# Patient Record
Sex: MALE | Race: WHITE | NOT HISPANIC OR LATINO
[De-identification: names, ages, dates, MRNs, and addresses within clinical notes are randomized per-mention and may not be internally consistent; named-entity substitution may affect disease eponyms.]

---

## 2017-04-24 ENCOUNTER — APPOINTMENT (OUTPATIENT)
Dept: ORTHOPEDIC SURGERY | Facility: CLINIC | Age: 37
End: 2017-04-24

## 2017-04-24 DIAGNOSIS — M67.912 UNSPECIFIED DISORDER OF SYNOVIUM AND TENDON, LEFT SHOULDER: ICD-10-CM

## 2017-05-22 ENCOUNTER — OUTPATIENT (OUTPATIENT)
Dept: OUTPATIENT SERVICES | Facility: HOSPITAL | Age: 37
LOS: 1 days | End: 2017-05-22
Payer: MEDICAID

## 2017-05-22 ENCOUNTER — APPOINTMENT (OUTPATIENT)
Dept: MRI IMAGING | Facility: HOSPITAL | Age: 37
End: 2017-05-22

## 2017-05-22 PROCEDURE — 73221 MRI JOINT UPR EXTREM W/O DYE: CPT

## 2017-05-26 ENCOUNTER — APPOINTMENT (OUTPATIENT)
Dept: ORTHOPEDIC SURGERY | Facility: CLINIC | Age: 37
End: 2017-05-26

## 2017-11-15 ENCOUNTER — OUTPATIENT (OUTPATIENT)
Dept: OUTPATIENT SERVICES | Facility: HOSPITAL | Age: 37
LOS: 1 days | End: 2017-11-15
Payer: MEDICAID

## 2017-11-15 ENCOUNTER — APPOINTMENT (OUTPATIENT)
Dept: MRI IMAGING | Facility: HOSPITAL | Age: 37
End: 2017-11-15

## 2017-11-15 PROCEDURE — 73221 MRI JOINT UPR EXTREM W/O DYE: CPT

## 2017-11-15 PROCEDURE — 73221 MRI JOINT UPR EXTREM W/O DYE: CPT | Mod: 26,RT

## 2017-11-27 ENCOUNTER — APPOINTMENT (OUTPATIENT)
Dept: CARDIOLOGY | Facility: CLINIC | Age: 37
End: 2017-11-27

## 2017-11-27 ENCOUNTER — OUTPATIENT (OUTPATIENT)
Dept: OUTPATIENT SERVICES | Facility: HOSPITAL | Age: 37
LOS: 1 days | End: 2017-11-27

## 2017-11-27 ENCOUNTER — APPOINTMENT (OUTPATIENT)
Dept: CARDIOLOGY | Facility: CLINIC | Age: 37
End: 2017-11-27
Payer: MEDICAID

## 2017-11-27 ENCOUNTER — NON-APPOINTMENT (OUTPATIENT)
Age: 37
End: 2017-11-27

## 2017-11-27 VITALS
SYSTOLIC BLOOD PRESSURE: 118 MMHG | DIASTOLIC BLOOD PRESSURE: 82 MMHG | RESPIRATION RATE: 17 BRPM | OXYGEN SATURATION: 99 % | BODY MASS INDEX: 27.74 KG/M2 | WEIGHT: 183 LBS | HEIGHT: 68 IN | HEART RATE: 100 BPM

## 2017-11-27 VITALS
WEIGHT: 179.9 LBS | HEART RATE: 80 BPM | HEIGHT: 68.5 IN | RESPIRATION RATE: 16 BRPM | TEMPERATURE: 98 F | SYSTOLIC BLOOD PRESSURE: 108 MMHG | DIASTOLIC BLOOD PRESSURE: 64 MMHG

## 2017-11-27 DIAGNOSIS — M67.912 UNSPECIFIED DISORDER OF SYNOVIUM AND TENDON, LEFT SHOULDER: ICD-10-CM

## 2017-11-27 DIAGNOSIS — R94.31 ABNORMAL ELECTROCARDIOGRAM [ECG] [EKG]: ICD-10-CM

## 2017-11-27 DIAGNOSIS — E03.9 HYPOTHYROIDISM, UNSPECIFIED: ICD-10-CM

## 2017-11-27 DIAGNOSIS — Z98.890 OTHER SPECIFIED POSTPROCEDURAL STATES: Chronic | ICD-10-CM

## 2017-11-27 DIAGNOSIS — Z82.49 FAMILY HISTORY OF ISCHEMIC HEART DISEASE AND OTHER DISEASES OF THE CIRCULATORY SYSTEM: ICD-10-CM

## 2017-11-27 PROCEDURE — 93000 ELECTROCARDIOGRAM COMPLETE: CPT

## 2017-11-27 PROCEDURE — 99244 OFF/OP CNSLTJ NEW/EST MOD 40: CPT

## 2017-11-27 NOTE — H&P PST ADULT - HISTORY OF PRESENT ILLNESS
38y/o male presents for preop eval for scheduled left shoulder distal clavicle excision on 12/5/2017. Pt with c/o left shoulder pain for past two years that has progressively worsened.   Per pt recent imaging show tendon disorder.

## 2017-11-27 NOTE — H&P PST ADULT - PRO ARRIVE FROM
Pharmacist Admission Medication Reconciliation Pending Note    Prior to Admission Medications were reviewed by the pharmacist and pended for provider review during admission medication reconciliation.    Medications were pended by the pharmacist at this time as follows:    Pended Admission Order Reconciliation Actions     Order Name Action Reordered As    aspirin 81 MG tablet Order for Admission aspirin chewable 81 mg    cholecalciferol (VITAMIN D3) 1000 UNITS tablet Order for Admission cholecalciferol (VITAMIN D3) tablet 1,000 Units    traMADOL (ULTRAM) 50 MG tablet Do Not Order for Admission     budesonide (ENTOCORT EC) 3 MG 24 hr capsule Order for Admission budesonide (ENTOCORT EC) 24 hr capsule 6 mg    loperamide (IMODIUM) 2 MG capsule Do Not Order for Admission     pravastatin (PRAVACHOL) 10 MG tablet Order for Admission pravastatin (PRAVACHOL) tablet 10 mg    ranitidine (ZANTAC) 150 MG tablet Order for Admission famotidine (PEPCID) tablet 20 mg    bisacodyl (DULCOLAX) 10 MG suppository Do Not Order for Admission     oxybutynin (DITROPAN) 5 MG tablet Order for Admission oxybutynin (DITROPAN) tablet 5 mg    magnesium hydroxide (MILK OF MAGNESIA) 400 MG/5ML suspension Do Not Order for Admission     docusate sodium-sennosides (SENOKOT S) 50-8.6 MG per tablet Order for Admission docusate sodium-sennosides (SENOKOT S) 50-8.6 MG 1 tablet    sodium biphosphate (FLEET) 7-19 GM/118ML enema Do Not Order for Admission     Lactobacillus (FLORANEX) Tab Do Not Order for Admission     morphine (ROXANOL) 100 MG/5ML concentrated solution Do Not Order for Admission     latanoprost (XALATAN) 0.005 % ophthalmic solution Order for Admission latanoprost (XALATAN) 0.005 % ophthalmic solution 1 drop    ALPRAZolam (XANAX) 0.25 MG tablet Order for Admission ALPRAZolam (XANAX) tablet 0.25-0.5 mg    chlorhexidine gluconate (PERIDEX) 0.12 % solution Do Not Order for Admission     prochlorperazine (COMPAZINE) 10 MG tablet Do Not Order for  Admission     acetaminophen (TYLENOL) 325 MG tablet Do Not Order for Admission     acetaminophen (TYLENOL) 650 MG suppository Do Not Order for Admission     HYDROcodone-acetaminophen (NORCO) 5-325 MG per tablet Do Not Order for Admission     atropine (ISOPTO ATROPINE) 1 % ophthalmic solution Do Not Order for Admission     levofloxacin (LEVAQUIN) 500 MG tablet Do Not Order for Admission             Orders Pended To Continue For Hospital Stay     ID Description Pended By When Reason    [id number] ALPRAZolam (XANAX) tablet 0.25-0.5 mg-NIGHTLY [facility]0     [id number] aspirin chewable 81 mg-DAILY [facility] 03/02/17 1140     [id number] budesonide (ENTOCORT EC) 24 hr capsule 6 mg-EVERY OTHER DAY [facility] 1140     [id number] cholecalciferol (VITAMIN D3) tablet 1,000 Units-DAILY [facility]17 1140     [id number] docusate sodium-sennosides (SENOKOT S) 50-8.6 MG 1 tablet-DAILY [facility] 1140     [id number] latanoprost (XALATAN) 0.005 % ophthalmic solution 1 drop-NIGHTLY [facility]17 1140     [id number] oxybutynin (DITROPAN) tablet 5 mg-2 TIMES DAILY [facility]17 1140     [id number] pravastatin (PRAVACHOL) tablet 10 mg-NIGHTLY [facility]17 1140     [id number] famotidine (PEPCID) tablet 20 mg-NIGHTLY [facility] 1140             Pharmacist Notations:           Orders that are ultimately reconciled and signed during admission medication reconciliation may differ from the pended actions above.    Please contact the pharmacist for questions.    [facility]RQUEZ [facility]  3/2/2017 11:40 AM   home

## 2017-11-27 NOTE — H&P PST ADULT - GASTROINTESTINAL DETAILS
bowel sounds normal/soft/nontender/no distention/no masses palpable nontender/no rebound tenderness/soft/no distention/no masses palpable/bowel sounds normal

## 2017-11-27 NOTE — H&P PST ADULT - PROBLEM SELECTOR PLAN 2
pt instructed to take [place] thyroid & levothyroxine on morning of surgery  medical eval requested by surgeon, pending copy of report  Spoke with Alissa in Dr Kumar (pcp) office.   Cbc, bmp done   pending copy of results.

## 2017-11-27 NOTE — H&P PST ADULT - PROBLEM SELECTOR PLAN 1
scheduled left shoulder distal clavicle excision on 12/5/2017  preop instructions, gi prophylaxis & surgical soap given  pt verbalized understanding

## 2017-11-27 NOTE — H&P PST ADULT - FAMILY HISTORY
Father  Still living? No  Family history of heart disease, Age at diagnosis: Age Unknown  Family history of thyroid cancer, Age at diagnosis: Age Unknown

## 2017-11-27 NOTE — H&P PST ADULT - PMH
Anxiety    Hypothyroidism Anxiety    Hypothyroidism    Unspecified disorder of synovium and tendon, left shoulder

## 2017-11-29 ENCOUNTER — APPOINTMENT (OUTPATIENT)
Dept: CARDIOLOGY | Facility: CLINIC | Age: 37
End: 2017-11-29
Payer: MEDICAID

## 2017-11-29 ENCOUNTER — APPOINTMENT (OUTPATIENT)
Dept: ORTHOPEDIC SURGERY | Facility: CLINIC | Age: 37
End: 2017-11-29
Payer: MEDICAID

## 2017-11-29 DIAGNOSIS — M25.511 PAIN IN RIGHT SHOULDER: ICD-10-CM

## 2017-11-29 PROCEDURE — 73030 X-RAY EXAM OF SHOULDER: CPT | Mod: RT

## 2017-11-29 PROCEDURE — 93015 CV STRESS TEST SUPVJ I&R: CPT

## 2017-11-29 PROCEDURE — 99213 OFFICE O/P EST LOW 20 MIN: CPT

## 2017-12-05 ENCOUNTER — APPOINTMENT (OUTPATIENT)
Dept: ORTHOPEDIC SURGERY | Facility: AMBULATORY SURGERY CENTER | Age: 37
End: 2017-12-05

## 2017-12-05 ENCOUNTER — OUTPATIENT (OUTPATIENT)
Dept: OUTPATIENT SERVICES | Facility: HOSPITAL | Age: 37
LOS: 1 days | Discharge: HOME | End: 2017-12-05
Payer: MEDICAID

## 2017-12-05 ENCOUNTER — TRANSCRIPTION ENCOUNTER (OUTPATIENT)
Age: 37
End: 2017-12-05

## 2017-12-05 VITALS
DIASTOLIC BLOOD PRESSURE: 57 MMHG | TEMPERATURE: 97 F | RESPIRATION RATE: 16 BRPM | HEART RATE: 66 BPM | WEIGHT: 179.9 LBS | HEIGHT: 68.5 IN | SYSTOLIC BLOOD PRESSURE: 105 MMHG | OXYGEN SATURATION: 99 %

## 2017-12-05 VITALS
TEMPERATURE: 98 F | SYSTOLIC BLOOD PRESSURE: 104 MMHG | OXYGEN SATURATION: 98 % | HEART RATE: 73 BPM | DIASTOLIC BLOOD PRESSURE: 72 MMHG

## 2017-12-05 DIAGNOSIS — M67.912 UNSPECIFIED DISORDER OF SYNOVIUM AND TENDON, LEFT SHOULDER: ICD-10-CM

## 2017-12-05 DIAGNOSIS — Z98.890 OTHER SPECIFIED POSTPROCEDURAL STATES: Chronic | ICD-10-CM

## 2017-12-05 PROCEDURE — 29824 SHO ARTHRS SRG DSTL CLAVICLC: CPT | Mod: LT

## 2017-12-05 PROCEDURE — 29826 SHO ARTHRS SRG DECOMPRESSION: CPT | Mod: LT

## 2017-12-05 NOTE — ASU DISCHARGE PLAN (ADULT/PEDIATRIC). - NOTIFY
Bleeding that does not stop/Inability to Tolerate Liquids or Foods/Fever greater than 101/Numbness, color, or temperature change to extremity/Pain not relieved by Medications/Swelling that continues/Persistent Nausea and Vomiting

## 2017-12-15 ENCOUNTER — APPOINTMENT (OUTPATIENT)
Dept: ORTHOPEDIC SURGERY | Facility: CLINIC | Age: 37
End: 2017-12-15
Payer: MEDICAID

## 2017-12-15 DIAGNOSIS — M19.019 PRIMARY OSTEOARTHRITIS, UNSPECIFIED SHOULDER: ICD-10-CM

## 2017-12-15 DIAGNOSIS — M75.42 IMPINGEMENT SYNDROME OF LEFT SHOULDER: ICD-10-CM

## 2017-12-15 PROCEDURE — 99024 POSTOP FOLLOW-UP VISIT: CPT

## 2017-12-15 RX ORDER — ERGOCALCIFEROL 1.25 MG/1
1.25 MG CAPSULE, LIQUID FILLED ORAL
Qty: 4 | Refills: 0 | Status: ACTIVE | COMMUNITY
Start: 2017-11-10

## 2018-01-30 ENCOUNTER — RX RENEWAL (OUTPATIENT)
Age: 38
End: 2018-01-30

## 2018-02-15 ENCOUNTER — APPOINTMENT (OUTPATIENT)
Dept: ORTHOPEDIC SURGERY | Facility: CLINIC | Age: 38
End: 2018-02-15
Payer: MEDICAID

## 2018-02-15 PROCEDURE — 99024 POSTOP FOLLOW-UP VISIT: CPT

## 2018-04-27 ENCOUNTER — OUTPATIENT (OUTPATIENT)
Dept: OUTPATIENT SERVICES | Facility: HOSPITAL | Age: 38
LOS: 1 days | End: 2018-04-27
Payer: MEDICAID

## 2018-04-27 ENCOUNTER — APPOINTMENT (OUTPATIENT)
Dept: MRI IMAGING | Facility: HOSPITAL | Age: 38
End: 2018-04-27
Payer: MEDICAID

## 2018-04-27 DIAGNOSIS — Z00.8 ENCOUNTER FOR OTHER GENERAL EXAMINATION: ICD-10-CM

## 2018-04-27 DIAGNOSIS — Z98.890 OTHER SPECIFIED POSTPROCEDURAL STATES: Chronic | ICD-10-CM

## 2018-04-27 PROCEDURE — 72141 MRI NECK SPINE W/O DYE: CPT | Mod: 26

## 2018-04-27 PROCEDURE — 72141 MRI NECK SPINE W/O DYE: CPT

## 2018-05-30 ENCOUNTER — APPOINTMENT (OUTPATIENT)
Dept: ORTHOPEDIC SURGERY | Facility: CLINIC | Age: 38
End: 2018-05-30
Payer: MEDICAID

## 2018-05-30 DIAGNOSIS — M47.22 OTHER SPONDYLOSIS WITH RADICULOPATHY, CERVICAL REGION: ICD-10-CM

## 2018-05-30 PROCEDURE — 99215 OFFICE O/P EST HI 40 MIN: CPT

## 2018-06-26 ENCOUNTER — RX RENEWAL (OUTPATIENT)
Age: 38
End: 2018-06-26

## 2018-08-17 PROBLEM — M67.912 UNSPECIFIED DISORDER OF SYNOVIUM AND TENDON, LEFT SHOULDER: Chronic | Status: ACTIVE | Noted: 2017-11-27

## 2018-08-18 ENCOUNTER — APPOINTMENT (OUTPATIENT)
Dept: RADIOLOGY | Facility: HOSPITAL | Age: 38
End: 2018-08-18
Payer: MEDICAID

## 2018-08-18 ENCOUNTER — OUTPATIENT (OUTPATIENT)
Dept: OUTPATIENT SERVICES | Facility: HOSPITAL | Age: 38
LOS: 1 days | End: 2018-08-18
Payer: MEDICAID

## 2018-08-18 DIAGNOSIS — Z98.890 OTHER SPECIFIED POSTPROCEDURAL STATES: Chronic | ICD-10-CM

## 2018-08-18 DIAGNOSIS — Z00.8 ENCOUNTER FOR OTHER GENERAL EXAMINATION: ICD-10-CM

## 2018-08-18 PROCEDURE — 72040 X-RAY EXAM NECK SPINE 2-3 VW: CPT

## 2018-08-18 PROCEDURE — 72040 X-RAY EXAM NECK SPINE 2-3 VW: CPT | Mod: 26

## 2018-09-22 ENCOUNTER — APPOINTMENT (OUTPATIENT)
Dept: RADIOLOGY | Facility: HOSPITAL | Age: 38
End: 2018-09-22
Payer: MEDICAID

## 2018-09-22 ENCOUNTER — OUTPATIENT (OUTPATIENT)
Dept: OUTPATIENT SERVICES | Facility: HOSPITAL | Age: 38
LOS: 1 days | End: 2018-09-22
Payer: MEDICAID

## 2018-09-22 DIAGNOSIS — Z98.890 OTHER SPECIFIED POSTPROCEDURAL STATES: Chronic | ICD-10-CM

## 2018-09-22 DIAGNOSIS — Z00.8 ENCOUNTER FOR OTHER GENERAL EXAMINATION: ICD-10-CM

## 2018-09-22 PROCEDURE — 72100 X-RAY EXAM L-S SPINE 2/3 VWS: CPT | Mod: 26

## 2018-09-22 PROCEDURE — 73523 X-RAY EXAM HIPS BI 5/> VIEWS: CPT | Mod: 26

## 2018-09-22 PROCEDURE — 73523 X-RAY EXAM HIPS BI 5/> VIEWS: CPT

## 2018-09-22 PROCEDURE — 72100 X-RAY EXAM L-S SPINE 2/3 VWS: CPT

## 2018-10-11 ENCOUNTER — APPOINTMENT (OUTPATIENT)
Dept: MRI IMAGING | Facility: HOSPITAL | Age: 38
End: 2018-10-11
Payer: MEDICAID

## 2018-10-11 ENCOUNTER — OUTPATIENT (OUTPATIENT)
Dept: OUTPATIENT SERVICES | Facility: HOSPITAL | Age: 38
LOS: 1 days | End: 2018-10-11
Payer: MEDICAID

## 2018-10-11 DIAGNOSIS — Z98.890 OTHER SPECIFIED POSTPROCEDURAL STATES: Chronic | ICD-10-CM

## 2018-10-11 DIAGNOSIS — Z00.8 ENCOUNTER FOR OTHER GENERAL EXAMINATION: ICD-10-CM

## 2018-10-11 PROCEDURE — 72148 MRI LUMBAR SPINE W/O DYE: CPT | Mod: 26

## 2018-10-11 PROCEDURE — 72148 MRI LUMBAR SPINE W/O DYE: CPT

## 2019-10-23 ENCOUNTER — APPOINTMENT (OUTPATIENT)
Dept: ORTHOPEDIC SURGERY | Facility: CLINIC | Age: 39
End: 2019-10-23
Payer: MEDICAID

## 2019-10-23 PROCEDURE — 99213 OFFICE O/P EST LOW 20 MIN: CPT

## 2019-10-23 PROCEDURE — 73080 X-RAY EXAM OF ELBOW: CPT

## 2019-10-25 ENCOUNTER — OUTPATIENT (OUTPATIENT)
Dept: OUTPATIENT SERVICES | Facility: HOSPITAL | Age: 39
LOS: 1 days | End: 2019-10-25

## 2019-10-25 VITALS
WEIGHT: 151.9 LBS | SYSTOLIC BLOOD PRESSURE: 110 MMHG | TEMPERATURE: 98 F | HEART RATE: 78 BPM | HEIGHT: 67.5 IN | RESPIRATION RATE: 14 BRPM | DIASTOLIC BLOOD PRESSURE: 60 MMHG | OXYGEN SATURATION: 99 %

## 2019-10-25 DIAGNOSIS — S46.211A STRAIN OF MUSCLE, FASCIA AND TENDON OF OTHER PARTS OF BICEPS, RIGHT ARM, INITIAL ENCOUNTER: ICD-10-CM

## 2019-10-25 DIAGNOSIS — Z98.890 OTHER SPECIFIED POSTPROCEDURAL STATES: Chronic | ICD-10-CM

## 2019-10-25 LAB
HCT VFR BLD CALC: 40 % — SIGNIFICANT CHANGE UP (ref 39–50)
HGB BLD-MCNC: 12.7 G/DL — LOW (ref 13–17)
MCHC RBC-ENTMCNC: 24.2 PG — LOW (ref 27–34)
MCHC RBC-ENTMCNC: 31.8 % — LOW (ref 32–36)
MCV RBC AUTO: 76.3 FL — LOW (ref 80–100)
NRBC # FLD: 0 K/UL — SIGNIFICANT CHANGE UP (ref 0–0)
PLATELET # BLD AUTO: 306 K/UL — SIGNIFICANT CHANGE UP (ref 150–400)
PMV BLD: 10.5 FL — SIGNIFICANT CHANGE UP (ref 7–13)
RBC # BLD: 5.24 M/UL — SIGNIFICANT CHANGE UP (ref 4.2–5.8)
RBC # FLD: 14.4 % — SIGNIFICANT CHANGE UP (ref 10.3–14.5)
WBC # BLD: 6.05 K/UL — SIGNIFICANT CHANGE UP (ref 3.8–10.5)
WBC # FLD AUTO: 6.05 K/UL — SIGNIFICANT CHANGE UP (ref 3.8–10.5)

## 2019-10-25 NOTE — H&P PST ADULT - NEGATIVE CARDIOVASCULAR SYMPTOMS
no claudication/no chest pain/no dyspnea on exertion/no paroxysmal nocturnal dyspnea/no orthopnea/no palpitations/no peripheral edema

## 2019-10-25 NOTE — H&P PST ADULT - NSICDXPASTMEDICALHX_GEN_ALL_CORE_FT
PAST MEDICAL HISTORY:  Anxiety no longer on medications    Attention deficit disorder (ADD)     Hypothyroidism self- corrected, no longer on medications    Spinal stenosis herniated disc    Unspecified disorder of synovium and tendon, left shoulder

## 2019-10-25 NOTE — H&P PST ADULT - HISTORY OF PRESENT ILLNESS
38 y/o male presents to PST with preop Dx strain of muscle, fascia and tendon of other parts of biceps, right arm scheduled for right distal biceps tendon reinsertion on 10/31/19. 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 Denies swelling, denies loss of sensation, tingling, denies limited range of motion. Reports pain is produced by hyperextending arm posteriorly or pulling motion. Reports intermittent pain

## 2019-10-25 NOTE — H&P PST ADULT - NSICDXPASTSURGICALHX_GEN_ALL_CORE_FT
PAST SURGICAL HISTORY:  H/O arthroscopy of shoulder right 2005    Status post shoulder surgery left shoulder , clavicle resection 2017

## 2019-10-25 NOTE — H&P PST ADULT - RS GEN PE MLT RESP DETAILS PC
no wheezes/no rales/airway patent/clear to auscultation bilaterally/no rhonchi/respirations non-labored/breath sounds equal/good air movement

## 2019-10-25 NOTE — H&P PST ADULT - ASSESSMENT
Problem: strain of muscle, fascia and tendon of other parts of biceps, right arm      Assessment and Plan: Patient scheduled for right distal biceps tendon reinsertion on 10/31/19.  Patient provided with verbal and written presurgical instructions; verbalized understanding  with teach back.    Patient provided with famotidine for GI prophylaxis; verbalized understanding.    Patient provided with Chlorhexidine wash, verbal and written instructions reviewed. Patient demonstrated understanding with teach back.     OR booking notified of right shoulder anchor      Echo and Stress test requested    Medical evaluation requested by surgeon, patient verbalized understanding, will make appointment Problem: strain of muscle, fascia and tendon of other parts of biceps, right arm      Assessment and Plan: Patient scheduled for right distal biceps tendon reinsertion on 10/31/19.  Patient provided with verbal and written presurgical instructions; verbalized understanding  with teach back.    Patient provided with famotidine for GI prophylaxis; verbalized understanding.    Patient provided with Chlorhexidine wash, verbal and written instructions reviewed. Patient demonstrated understanding with teach back.     OR booking notified of right shoulder anchor      Echo and Stress test in chart     Medical evaluation requested by surgeon, patient verbalized understanding, will make appointment

## 2019-10-25 NOTE — H&P PST ADULT - NSICDXFAMILYHX_GEN_ALL_CORE_FT
FAMILY HISTORY:  Father  Still living? No  Family history of heart disease, Age at diagnosis: Age Unknown  Family history of thyroid cancer, Age at diagnosis: Age Unknown

## 2019-10-28 PROBLEM — F41.9 ANXIETY DISORDER, UNSPECIFIED: Chronic | Status: ACTIVE | Noted: 2017-11-27

## 2019-10-28 PROBLEM — F98.8 OTHER SPECIFIED BEHAVIORAL AND EMOTIONAL DISORDERS WITH ONSET USUALLY OCCURRING IN CHILDHOOD AND ADOLESCENCE: Chronic | Status: ACTIVE | Noted: 2019-10-25

## 2019-10-28 PROBLEM — M48.00 SPINAL STENOSIS, SITE UNSPECIFIED: Chronic | Status: ACTIVE | Noted: 2019-10-25

## 2019-10-30 ENCOUNTER — TRANSCRIPTION ENCOUNTER (OUTPATIENT)
Age: 39
End: 2019-10-30

## 2019-10-31 ENCOUNTER — OUTPATIENT (OUTPATIENT)
Dept: OUTPATIENT SERVICES | Facility: HOSPITAL | Age: 39
LOS: 1 days | Discharge: HOME | End: 2019-10-31
Payer: MEDICAID

## 2019-10-31 ENCOUNTER — APPOINTMENT (OUTPATIENT)
Dept: ORTHOPEDIC SURGERY | Facility: AMBULATORY SURGERY CENTER | Age: 39
End: 2019-10-31

## 2019-10-31 VITALS
RESPIRATION RATE: 16 BRPM | TEMPERATURE: 98 F | HEIGHT: 67.5 IN | HEART RATE: 72 BPM | SYSTOLIC BLOOD PRESSURE: 107 MMHG | DIASTOLIC BLOOD PRESSURE: 55 MMHG | OXYGEN SATURATION: 100 % | WEIGHT: 151.9 LBS

## 2019-10-31 VITALS
TEMPERATURE: 98 F | HEART RATE: 76 BPM | DIASTOLIC BLOOD PRESSURE: 66 MMHG | RESPIRATION RATE: 16 BRPM | SYSTOLIC BLOOD PRESSURE: 108 MMHG | OXYGEN SATURATION: 100 %

## 2019-10-31 DIAGNOSIS — Z98.890 OTHER SPECIFIED POSTPROCEDURAL STATES: Chronic | ICD-10-CM

## 2019-10-31 DIAGNOSIS — S46.211A STRAIN OF MUSCLE, FASCIA AND TENDON OF OTHER PARTS OF BICEPS, RIGHT ARM, INITIAL ENCOUNTER: ICD-10-CM

## 2019-10-31 PROCEDURE — 24342 REPAIR OF RUPTURED TENDON: CPT | Mod: RT

## 2019-10-31 RX ORDER — OXYCODONE HYDROCHLORIDE 5 MG/1
1 TABLET ORAL
Qty: 0 | Refills: 0 | DISCHARGE

## 2019-10-31 NOTE — ASU DISCHARGE PLAN (ADULT/PEDIATRIC) - CALL YOUR DOCTOR IF YOU HAVE ANY OF THE FOLLOWING:
Wound/Surgical Site with redness, or foul smelling discharge or pus/Pain not relieved by Medications/Numbness, tingling, color or temperature change to extremity/Bleeding that does not stop

## 2019-10-31 NOTE — ASU DISCHARGE PLAN (ADULT/PEDIATRIC) - ASU DC SPECIAL INSTRUCTIONSFT
See instruction sheet. Follow up with Dr. Beatty in one week  Keep dressing and splint clean, dry, and intact.  Do not extend arm out of splint.  Keep arm in sling.   Percocet for pain control has been prescribed. Take for severe pain.

## 2019-10-31 NOTE — ASU DISCHARGE PLAN (ADULT/PEDIATRIC) - ACTIVITY LEVEL
No sports/gym/No heavy lifting No heavy lifting/No sports/gym/No weight bearing/Right upper extremity/Elevate extremity

## 2019-10-31 NOTE — ASU DISCHARGE PLAN (ADULT/PEDIATRIC) - CARE PROVIDER_API CALL
Madi Beatty)  Orthopaedic Sports Medicine; Orthopaedic Surgery  [address]  [place] [address]  Phone: [phone]  Fax: [phone]  Follow Up Time:

## 2019-11-06 ENCOUNTER — APPOINTMENT (OUTPATIENT)
Dept: ORTHOPEDIC SURGERY | Facility: CLINIC | Age: 39
End: 2019-11-06
Payer: MEDICAID

## 2019-11-06 PROCEDURE — 99024 POSTOP FOLLOW-UP VISIT: CPT

## 2019-11-27 ENCOUNTER — APPOINTMENT (OUTPATIENT)
Dept: ORTHOPEDIC SURGERY | Facility: CLINIC | Age: 39
End: 2019-11-27
Payer: MEDICAID

## 2019-11-27 PROCEDURE — 99024 POSTOP FOLLOW-UP VISIT: CPT

## 2019-12-19 ENCOUNTER — APPOINTMENT (OUTPATIENT)
Dept: ORTHOPEDIC SURGERY | Facility: CLINIC | Age: 39
End: 2019-12-19
Payer: MEDICAID

## 2019-12-19 ENCOUNTER — APPOINTMENT (OUTPATIENT)
Dept: ORTHOPEDIC SURGERY | Facility: CLINIC | Age: 39
End: 2019-12-19

## 2019-12-19 DIAGNOSIS — S46.211A STRAIN OF MUSCLE, FASCIA AND TENDON OF OTHER PARTS OF BICEPS, RIGHT ARM, INITIAL ENCOUNTER: ICD-10-CM

## 2019-12-19 PROCEDURE — 99024 POSTOP FOLLOW-UP VISIT: CPT

## 2020-01-17 ENCOUNTER — APPOINTMENT (OUTPATIENT)
Dept: ORTHOPEDIC SURGERY | Facility: CLINIC | Age: 40
End: 2020-01-17
Payer: MEDICAID

## 2020-01-17 DIAGNOSIS — S46.211D STRAIN OF MUSCLE, FASCIA AND TENDON OF OTHER PARTS OF BICEPS, RIGHT ARM, SUBSEQUENT ENCOUNTER: ICD-10-CM

## 2020-01-17 PROCEDURE — 99024 POSTOP FOLLOW-UP VISIT: CPT

## 2020-01-24 ENCOUNTER — APPOINTMENT (OUTPATIENT)
Dept: ORTHOPEDIC SURGERY | Facility: CLINIC | Age: 40
End: 2020-01-24
Payer: MEDICAID

## 2020-01-24 VITALS
WEIGHT: 175 LBS | BODY MASS INDEX: 26.52 KG/M2 | DIASTOLIC BLOOD PRESSURE: 78 MMHG | HEART RATE: 82 BPM | SYSTOLIC BLOOD PRESSURE: 127 MMHG | HEIGHT: 68 IN

## 2020-01-24 DIAGNOSIS — S46.212A STRAIN OF MUSCLE, FASCIA AND TENDON OF OTHER PARTS OF BICEPS, LEFT ARM, INITIAL ENCOUNTER: ICD-10-CM

## 2020-01-24 PROCEDURE — 73080 X-RAY EXAM OF ELBOW: CPT | Mod: LT

## 2020-01-24 PROCEDURE — 99214 OFFICE O/P EST MOD 30 MIN: CPT | Mod: 24

## 2020-01-28 ENCOUNTER — TRANSCRIPTION ENCOUNTER (OUTPATIENT)
Age: 40
End: 2020-01-28

## 2020-01-28 ENCOUNTER — OUTPATIENT (OUTPATIENT)
Dept: OUTPATIENT SERVICES | Facility: HOSPITAL | Age: 40
LOS: 1 days | End: 2020-01-28

## 2020-01-28 VITALS
HEIGHT: 67.5 IN | SYSTOLIC BLOOD PRESSURE: 100 MMHG | DIASTOLIC BLOOD PRESSURE: 58 MMHG | RESPIRATION RATE: 16 BRPM | HEART RATE: 73 BPM | OXYGEN SATURATION: 99 % | WEIGHT: 169.98 LBS | TEMPERATURE: 97 F

## 2020-01-28 DIAGNOSIS — S46.212A STRAIN OF MUSCLE, FASCIA AND TENDON OF OTHER PARTS OF BICEPS, LEFT ARM, INITIAL ENCOUNTER: ICD-10-CM

## 2020-01-28 DIAGNOSIS — S46.211A STRAIN OF MUSCLE, FASCIA AND TENDON OF OTHER PARTS OF BICEPS, RIGHT ARM, INITIAL ENCOUNTER: Chronic | ICD-10-CM

## 2020-01-28 DIAGNOSIS — Z98.890 OTHER SPECIFIED POSTPROCEDURAL STATES: Chronic | ICD-10-CM

## 2020-01-28 LAB
HCT VFR BLD CALC: 45.2 % — SIGNIFICANT CHANGE UP (ref 39–50)
HGB BLD-MCNC: 14.4 G/DL — SIGNIFICANT CHANGE UP (ref 13–17)
MCHC RBC-ENTMCNC: 24.7 PG — LOW (ref 27–34)
MCHC RBC-ENTMCNC: 31.9 % — LOW (ref 32–36)
MCV RBC AUTO: 77.5 FL — LOW (ref 80–100)
NRBC # FLD: 0 K/UL — SIGNIFICANT CHANGE UP (ref 0–0)
PLATELET # BLD AUTO: 272 K/UL — SIGNIFICANT CHANGE UP (ref 150–400)
PMV BLD: 10.2 FL — SIGNIFICANT CHANGE UP (ref 7–13)
RBC # BLD: 5.83 M/UL — HIGH (ref 4.2–5.8)
RBC # FLD: 15.1 % — HIGH (ref 10.3–14.5)
WBC # BLD: 5.06 K/UL — SIGNIFICANT CHANGE UP (ref 3.8–10.5)
WBC # FLD AUTO: 5.06 K/UL — SIGNIFICANT CHANGE UP (ref 3.8–10.5)

## 2020-01-28 RX ORDER — DEXTROAMPHETAMINE SACCHARATE, AMPHETAMINE ASPARTATE, DEXTROAMPHETAMINE SULFATE AND AMPHETAMINE SULFATE 1.875; 1.875; 1.875; 1.875 MG/1; MG/1; MG/1; MG/1
1 TABLET ORAL
Qty: 0 | Refills: 0 | DISCHARGE

## 2020-01-28 NOTE — H&P PST ADULT - GASTROINTESTINAL DETAILS
soft/nontender/no masses palpable/bowel sounds normal/no rebound tenderness/no distention no distention/soft/bowel sounds normal/nontender

## 2020-01-28 NOTE — H&P PST ADULT - MUSCULOSKELETAL
details… detailed exam decreased ROM due to pain left arm d/t bicep injury/decreased ROM due to pain

## 2020-01-28 NOTE — H&P PST ADULT - NEGATIVE ENMT SYMPTOMS
no nasal congestion/no hearing difficulty/no nasal obstruction/no ear pain/no sinus symptoms/no nasal discharge/no tinnitus/no throat pain/no dysphagia

## 2020-01-28 NOTE — H&P PST ADULT - PRO TOBACCO QUIT READY
"try yo quit - quitted for 2 days now"/thinking about quitting thinking about quitting/"I am trying to quit - quitted for 2 days now"

## 2020-01-28 NOTE — H&P PST ADULT - NSICDXPASTSURGICALHX_GEN_ALL_CORE_FT
PAST SURGICAL HISTORY:  H/O arthroscopy of shoulder right 2005    Status post shoulder surgery left shoulder , clavicle resection 2017    Strain of right biceps s/p right biceps tendon reinsertion on 10/31/2019

## 2020-01-28 NOTE — H&P PST ADULT - NEGATIVE NEUROLOGICAL SYMPTOMS
no paresthesias/no tremors/no loss of sensation/no syncope/no vertigo/no headache/no difficulty walking

## 2020-01-28 NOTE — H&P PST ADULT - RS GEN PE MLT RESP DETAILS PC
breath sounds equal/clear to auscultation bilaterally/good air movement/no wheezes/no rhonchi/respirations non-labored/no rales/airway patent good air movement/respirations non-labored/no wheezes/clear to auscultation bilaterally/no rales/no rhonchi

## 2020-01-28 NOTE — H&P PST ADULT - MUSCULOSKELETAL COMMENTS
PMH spinal stenosis with herniated disc right upper arm preop dx of strain of muscle, fascia and tendon of other parts of biceps, left arm, initial encounter

## 2020-01-28 NOTE — H&P PST ADULT - HISTORY OF PRESENT ILLNESS
40 y/o male presents to PST with preop Dx strain of muscle, fascia and tendon of other parts of biceps, right arm scheduled for right distal biceps tendon reinsertion on 10/31/19. 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 Denies swelling, denies loss of sensation, tingling, denies limited range of motion. Reports pain is produced by hyperextending arm posteriorly or pulling motion. Reports intermittent pain    40 yo male with preop dx of strain of muscle, fascia, and tendon of other parts of biceps, left arm, initial encounter presents to have PST evaluation with c/o left bicep injury on 11/22/2019, while trying to lift stove at home, went for an evaluation, seen by Dr. Beatty,  had x- ray done, now scheduled for left distal biceps tendon reinsertion on 1/29/2020. Patient c/o pain on left arm with extension, supination 40 yo male with preop dx of strain of muscle, fascia, and tendon of other parts of biceps, left arm, initial encounter presents to have PST evaluation with c/o pain on left arm from bicep injury on 1/22/2019, while trying to lift stove at home, went for an evaluation, seen by Dr. Beatty,  had x- ray done, now scheduled for left distal biceps tendon reinsertion on 1/29/2020.

## 2020-01-28 NOTE — H&P PST ADULT - NSICDXPASTMEDICALHX_GEN_ALL_CORE_FT
PAST MEDICAL HISTORY:  Anxiety no longer on medications    Attention deficit disorder (ADD)     Hypothyroidism self- corrected, no longer on medications (> 1 yr), last TFT's done in 8/2019 - normal    Spinal stenosis herniated disc    Unspecified disorder of synovium and tendon, left shoulder

## 2020-01-28 NOTE — H&P PST ADULT - NEGATIVE GENERAL GENITOURINARY SYMPTOMS
no hematuria/no flank pain R/no urinary hesitancy/no bladder infections/no dysuria/normal urinary frequency/no flank pain L

## 2020-01-28 NOTE — H&P PST ADULT - MS GEN HX ROS MEA POS PC
back pain/left bicep/stiffness/neck pain/joint pain stiffness/back pain/joint pain/left bicep - from injury/neck pain

## 2020-01-29 ENCOUNTER — APPOINTMENT (OUTPATIENT)
Dept: ORTHOPEDIC SURGERY | Facility: AMBULATORY SURGERY CENTER | Age: 40
End: 2020-01-29

## 2020-01-29 ENCOUNTER — OUTPATIENT (OUTPATIENT)
Dept: OUTPATIENT SERVICES | Facility: HOSPITAL | Age: 40
LOS: 1 days | Discharge: HOME | End: 2020-01-29
Payer: MEDICAID

## 2020-01-29 VITALS
OXYGEN SATURATION: 97 % | HEART RATE: 75 BPM | RESPIRATION RATE: 20 BRPM | DIASTOLIC BLOOD PRESSURE: 82 MMHG | SYSTOLIC BLOOD PRESSURE: 123 MMHG | TEMPERATURE: 98 F

## 2020-01-29 VITALS
RESPIRATION RATE: 18 BRPM | TEMPERATURE: 97 F | DIASTOLIC BLOOD PRESSURE: 66 MMHG | WEIGHT: 169.98 LBS | HEIGHT: 67.5 IN | OXYGEN SATURATION: 100 % | HEART RATE: 67 BPM | SYSTOLIC BLOOD PRESSURE: 121 MMHG

## 2020-01-29 DIAGNOSIS — Z98.890 OTHER SPECIFIED POSTPROCEDURAL STATES: Chronic | ICD-10-CM

## 2020-01-29 DIAGNOSIS — S46.212A STRAIN OF MUSCLE, FASCIA AND TENDON OF OTHER PARTS OF BICEPS, LEFT ARM, INITIAL ENCOUNTER: ICD-10-CM

## 2020-01-29 DIAGNOSIS — S46.211A STRAIN OF MUSCLE, FASCIA AND TENDON OF OTHER PARTS OF BICEPS, RIGHT ARM, INITIAL ENCOUNTER: Chronic | ICD-10-CM

## 2020-01-29 PROCEDURE — 24342 REPAIR OF RUPTURED TENDON: CPT | Mod: AS

## 2020-01-29 PROCEDURE — 24342 REPAIR OF RUPTURED TENDON: CPT | Mod: 79,LT

## 2020-01-29 RX ORDER — SODIUM CHLORIDE 9 MG/ML
1000 INJECTION, SOLUTION INTRAVENOUS
Refills: 0 | Status: DISCONTINUED | OUTPATIENT
Start: 2020-01-29 | End: 2020-02-17

## 2020-01-29 NOTE — ASU DISCHARGE PLAN (ADULT/PEDIATRIC) - CALL YOUR DOCTOR IF YOU HAVE ANY OF THE FOLLOWING:
Numbness, tingling, color or temperature change to extremity/Fever greater than (need to indicate Fahrenheit or Celsius)

## 2020-02-05 ENCOUNTER — APPOINTMENT (OUTPATIENT)
Dept: ORTHOPEDIC SURGERY | Facility: CLINIC | Age: 40
End: 2020-02-05
Payer: MEDICAID

## 2020-02-05 VITALS — WEIGHT: 175 LBS | BODY MASS INDEX: 26.52 KG/M2 | HEIGHT: 68 IN

## 2020-02-05 VITALS
SYSTOLIC BLOOD PRESSURE: 124 MMHG | HEIGHT: 68 IN | HEART RATE: 83 BPM | DIASTOLIC BLOOD PRESSURE: 80 MMHG | BODY MASS INDEX: 26.52 KG/M2 | WEIGHT: 175 LBS

## 2020-02-05 PROBLEM — E03.9 HYPOTHYROIDISM, UNSPECIFIED: Chronic | Status: ACTIVE | Noted: 2017-11-27

## 2020-02-05 PROCEDURE — 99024 POSTOP FOLLOW-UP VISIT: CPT

## 2020-02-27 ENCOUNTER — APPOINTMENT (OUTPATIENT)
Dept: ORTHOPEDIC SURGERY | Facility: CLINIC | Age: 40
End: 2020-02-27
Payer: MEDICAID

## 2020-02-27 PROCEDURE — 99024 POSTOP FOLLOW-UP VISIT: CPT

## 2020-03-27 ENCOUNTER — APPOINTMENT (OUTPATIENT)
Dept: ORTHOPEDIC SURGERY | Facility: CLINIC | Age: 40
End: 2020-03-27
Payer: MEDICAID

## 2020-03-27 VITALS
HEIGHT: 68 IN | BODY MASS INDEX: 28.04 KG/M2 | SYSTOLIC BLOOD PRESSURE: 117 MMHG | WEIGHT: 185 LBS | HEART RATE: 75 BPM | DIASTOLIC BLOOD PRESSURE: 69 MMHG | TEMPERATURE: 98.1 F

## 2020-03-27 PROCEDURE — 99024 POSTOP FOLLOW-UP VISIT: CPT

## 2020-04-29 ENCOUNTER — APPOINTMENT (OUTPATIENT)
Dept: ORTHOPEDIC SURGERY | Facility: CLINIC | Age: 40
End: 2020-04-29

## 2020-06-05 ENCOUNTER — APPOINTMENT (OUTPATIENT)
Dept: ORTHOPEDIC SURGERY | Facility: CLINIC | Age: 40
End: 2020-06-05
Payer: MEDICAID

## 2020-06-05 DIAGNOSIS — S46.212D STRAIN OF MUSCLE, FASCIA AND TENDON OF OTHER PARTS OF BICEPS, LEFT ARM, SUBSEQUENT ENCOUNTER: ICD-10-CM

## 2020-06-05 PROCEDURE — 99213 OFFICE O/P EST LOW 20 MIN: CPT

## 2020-10-15 ENCOUNTER — APPOINTMENT (OUTPATIENT)
Dept: RADIOLOGY | Facility: HOSPITAL | Age: 40
End: 2020-10-15
Payer: MEDICAID

## 2020-10-15 ENCOUNTER — OUTPATIENT (OUTPATIENT)
Dept: OUTPATIENT SERVICES | Facility: HOSPITAL | Age: 40
LOS: 1 days | End: 2020-10-15
Payer: MEDICAID

## 2020-10-15 DIAGNOSIS — Z98.890 OTHER SPECIFIED POSTPROCEDURAL STATES: Chronic | ICD-10-CM

## 2020-10-15 DIAGNOSIS — Z00.8 ENCOUNTER FOR OTHER GENERAL EXAMINATION: ICD-10-CM

## 2020-10-15 DIAGNOSIS — S46.211A STRAIN OF MUSCLE, FASCIA AND TENDON OF OTHER PARTS OF BICEPS, RIGHT ARM, INITIAL ENCOUNTER: Chronic | ICD-10-CM

## 2020-10-15 PROCEDURE — 72100 X-RAY EXAM L-S SPINE 2/3 VWS: CPT

## 2020-10-15 PROCEDURE — 72100 X-RAY EXAM L-S SPINE 2/3 VWS: CPT | Mod: 26

## 2020-11-03 ENCOUNTER — APPOINTMENT (OUTPATIENT)
Dept: PHYSICAL MEDICINE AND REHAB | Facility: CLINIC | Age: 40
End: 2020-11-03
Payer: MEDICAID

## 2020-11-03 VITALS
TEMPERATURE: 96.6 F | DIASTOLIC BLOOD PRESSURE: 75 MMHG | SYSTOLIC BLOOD PRESSURE: 105 MMHG | OXYGEN SATURATION: 98 % | HEART RATE: 71 BPM

## 2020-11-03 PROCEDURE — 99072 ADDL SUPL MATRL&STAF TM PHE: CPT

## 2020-11-03 PROCEDURE — 99204 OFFICE O/P NEW MOD 45 MIN: CPT

## 2020-11-03 RX ORDER — CABERGOLINE 0.5 MG/1
0.5 TABLET ORAL
Qty: 16 | Refills: 0 | Status: DISCONTINUED | COMMUNITY
Start: 2017-09-29 | End: 2020-11-03

## 2020-11-03 RX ORDER — OXYCODONE AND ACETAMINOPHEN 5; 325 MG/1; MG/1
5-325 TABLET ORAL
Qty: 15 | Refills: 0 | Status: DISCONTINUED | COMMUNITY
Start: 2020-01-29 | End: 2020-11-03

## 2020-11-03 RX ORDER — OXYCODONE HYDROCHLORIDE 30 MG/1
30 TABLET ORAL
Qty: 120 | Refills: 0 | Status: DISCONTINUED | COMMUNITY
Start: 2017-11-27 | End: 2020-11-03

## 2020-11-03 RX ORDER — DEXTROAMPHETAMINE SACCHARATE, AMPHETAMINE ASPARTATE, DEXTROAMPHETAMINE SULFATE AND AMPHETAMINE SULFATE 7.5; 7.5; 7.5; 7.5 MG/1; MG/1; MG/1; MG/1
30 TABLET ORAL
Qty: 60 | Refills: 0 | Status: DISCONTINUED | COMMUNITY
Start: 2017-11-27 | End: 2020-11-03

## 2020-11-03 RX ORDER — LEVOTHYROXINE SODIUM 0.05 MG/1
50 TABLET ORAL
Qty: 30 | Refills: 0 | Status: DISCONTINUED | COMMUNITY
Start: 2017-11-29 | End: 2020-11-03

## 2020-11-03 RX ORDER — MELOXICAM 15 MG/1
15 TABLET ORAL
Qty: 30 | Refills: 0 | Status: DISCONTINUED | COMMUNITY
Start: 2017-12-06 | End: 2020-11-03

## 2020-11-03 RX ORDER — ALPRAZOLAM 2 MG/1
2 TABLET ORAL
Qty: 90 | Refills: 0 | Status: DISCONTINUED | COMMUNITY
Start: 2017-11-27 | End: 2020-11-03

## 2020-11-03 NOTE — REVIEW OF SYSTEMS
[Negative] : Heme/Lymph [FreeTextEntry9] : multiple joint pains, points of tenderness [de-identified] : Some tingling down R arm and bilateral legs

## 2020-11-03 NOTE — ASSESSMENT
[FreeTextEntry1] : After review of the history, physical examination, and imaging, the patient's symptoms are consistent with cervical/lumbar spondylosis, possible cervical/lumbar radiculopathy in conjunction with possible underlying rheumatological disorder. The imaging results and diagnosis were discussed in detail with the patient. We discussed all the potential treatment options including physical therapy, oral medication, interventional spine procedures, and surgery; as well as alternative therapeutics such as acupuncture and massage. We also discussed the importance of maintaining a healthy weight, ergonomics, and posture in the long term management of the condition. At this time, will recommend the following:\par -MRI L-Spine to evaluate for nerve impingement causing patient's pain. Patient has spondylosis present on MRI and this pain has been present for over a year, worsening more recently, not improving with Diclofenac. \par -Mobic 7.5mg-15mg Qdaily #60, to take with food. Advised patient to not take other NSAIDs with this medication.\par -Start physical therapy for core strengthening, stretching, ROM exercises, HEP and modalities including moist heat, myofascial release, TENS\par -Referred patient to rheumatology to rule out any underlying rheumatological disease/connective tissue disorder that can be contributing to his pain. \par -RTC in 4 weeks. Could consider starting GABAergic medication in future as well as obtaining MRI C-Spine.\par \par The patient expressed verbal understanding and is in agreement with the plan of care. Patient educated on red flag signs including any changes to his bowel/bladder control, groin numbness or new weakness. Patient knows to seek immediate attention by calling 911 or going to nearest ER if these symptoms appear. All of the patient's questions and concerns were addressed during today's visit.

## 2020-11-03 NOTE — HISTORY OF PRESENT ILLNESS
[FreeTextEntry1] : Mr. ANISA SANTOS is a 40 year old  male who presents with low back and neck pain. \par \par Location:Bilateral low back and neck, 50% neck, 50% low back.\par Onset:Worse 3 weeks ago but recently felt a "pop" in low back about a week ago, worsening the pain\par Provocation/Palliative:Worse in AM when he first wakes up, then gets bad again at night. \par Quality:Constant aching\par Radiation:Low back into R>L thigh, both anterior and posterior, also R arm but not as severe\par Severity:3/10, 8/10 at worst\par Timing:Not improving\par \par Has tingling in R Arm.  Denies any associated leg weakness. Denies any loss of bowel/bladder control or any groin numbness.\par Previous medications trialed:Muscle relaxers, Diclofenac\par Previous procedures relevant to complaint:Toradol injection\par Conservative therapy tried?:None recently

## 2020-11-03 NOTE — PHYSICAL EXAM
[FreeTextEntry1] : PE:\par Constitutional: In NAD, calm and cooperative\par HEENT: NCAT, Anicteric sclera, no lid-lag\par Cardio: Extremities appear pink and well perfused, no peripheral edema\par Respiratory: Normal respiratory effort on room air, no accessory muscle use\par Skin: no rashes seen on exposed skin, no visible abrasions\par Psych: Normal affect, intact judgment and insight\par Neuro: Awake, alert and oriented x 3, see below for focused neurological exam\par MSK (Neck):\par 	Inspection: no gross swelling identified\par 	Palpation: Mild Tenderness of the bilateral lower cervical paraspinals\par 	ROM: Pain at end cervical extension and right lateral rotation/sidebending\par 	Strength: 5/5 strength in bilateral upper extremities\par 	Reflexes: 2+ Biceps//Brachioradialis reflex bilaterally, Roque’s negative bilaterally\par 	Sensation: Intact to light touch in bilateral upper extremities\par 	Special tests: Spurling’s test negative bilaterally\par \par MSK (Low back):\par 	Inspection: no gross swelling identified\par 	Palpation: Tenderness of the bilateral, R>L, lower lumbar paraspinals\par 	ROM: Pain at end lumbar extension,  no pain with flexion\par 	Strength: 5/5 strength in bilateral lower extremities\par 	Reflexes: 2+ Patella reflex bilaterally, 1+ Achilles reflex bilaterally, negative clonus bilaterally\par 	Sensation: Intact to light touch in bilateral lower extremities\par 	Special tests: SLR negative bilaterally, EMMA negative bilaterally, FADIR negative bilaterally, Facet loading negative bilaterally\par

## 2020-11-03 NOTE — DATA REVIEWED
[FreeTextEntry1] : X-Ray LS Spine: Multilevel spondylosis\par \par EXAM: SPINE LUMBOSACRAL 2 OR 3 VIEWS \par \par \par PROCEDURE DATE: 10/15/2020 \par \par \par \par INTERPRETATION: Radiographs of the lumbar spine CPT 72067 \par \par CLINICAL INFORMATION: Lower back pain of unknown severity x2 years. \par \par TECHNIQUE: Frontal and lateral views of the lumbar spine and AP view of the pelvis were obtained. \par \par FINDINGS: Prior study dated 9/22/2018 was available for review. \par \par No gross vertebral body fracture is demonstrated. Degenerative changes with osteophyte formation are noted throughout the lumbar spine. Disc space narrowing is noted at the L3-L4 level. The sacroiliac joints appear symmetric bilaterally. Lordosis is maintained. \par \par IMPRESSION: Degenerative changes noted in the lumbar spine as noted above. Disc space narrowing is noted at the L3-L4 level. \par \par \par \par \par \par \par \par \par \par NIKKIE GARAY M.D., ATTENDING RADIOLOGIST \par This document has been electronically signed. Oct 15 2020 3:00PM

## 2020-11-06 ENCOUNTER — APPOINTMENT (OUTPATIENT)
Dept: ORTHOPEDIC SURGERY | Facility: CLINIC | Age: 40
End: 2020-11-06
Payer: MEDICAID

## 2020-11-06 VITALS — HEIGHT: 68 IN | BODY MASS INDEX: 29.55 KG/M2 | WEIGHT: 195 LBS

## 2020-11-06 DIAGNOSIS — S46.211S STRAIN OF MUSCLE, FASCIA AND TENDON OF OTHER PARTS OF BICEPS, RIGHT ARM, SEQUELA: ICD-10-CM

## 2020-11-06 PROCEDURE — 99213 OFFICE O/P EST LOW 20 MIN: CPT

## 2020-11-06 PROCEDURE — 99072 ADDL SUPL MATRL&STAF TM PHE: CPT

## 2020-11-06 PROCEDURE — 73030 X-RAY EXAM OF SHOULDER: CPT | Mod: RT

## 2020-11-06 RX ORDER — CYCLOBENZAPRINE HYDROCHLORIDE 10 MG/1
10 TABLET, FILM COATED ORAL
Qty: 14 | Refills: 0 | Status: DISCONTINUED | COMMUNITY
Start: 2020-10-09

## 2020-12-01 ENCOUNTER — APPOINTMENT (OUTPATIENT)
Dept: PHYSICAL MEDICINE AND REHAB | Facility: CLINIC | Age: 40
End: 2020-12-01
Payer: MEDICAID

## 2020-12-01 VITALS
OXYGEN SATURATION: 99 % | RESPIRATION RATE: 12 BRPM | HEART RATE: 72 BPM | SYSTOLIC BLOOD PRESSURE: 125 MMHG | DIASTOLIC BLOOD PRESSURE: 77 MMHG

## 2020-12-01 VITALS — SYSTOLIC BLOOD PRESSURE: 144 MMHG | OXYGEN SATURATION: 92 % | TEMPERATURE: 97.7 F | DIASTOLIC BLOOD PRESSURE: 83 MMHG

## 2020-12-01 DIAGNOSIS — M54.2 CERVICALGIA: ICD-10-CM

## 2020-12-01 DIAGNOSIS — M79.18 MYALGIA, OTHER SITE: ICD-10-CM

## 2020-12-01 DIAGNOSIS — M62.830 MUSCLE SPASM OF BACK: ICD-10-CM

## 2020-12-01 DIAGNOSIS — M25.50 PAIN IN UNSPECIFIED JOINT: ICD-10-CM

## 2020-12-01 PROCEDURE — 99072 ADDL SUPL MATRL&STAF TM PHE: CPT

## 2020-12-01 PROCEDURE — 99214 OFFICE O/P EST MOD 30 MIN: CPT

## 2020-12-01 RX ORDER — DICLOFENAC SODIUM 75 MG/1
75 TABLET, DELAYED RELEASE ORAL
Qty: 60 | Refills: 0 | Status: DISCONTINUED | COMMUNITY
Start: 2018-05-30 | End: 2020-12-01

## 2020-12-01 RX ORDER — CYCLOBENZAPRINE HYDROCHLORIDE 10 MG/1
10 TABLET, FILM COATED ORAL
Qty: 30 | Refills: 0 | Status: ACTIVE | COMMUNITY
Start: 2020-12-01 | End: 1900-01-01

## 2020-12-01 NOTE — PHYSICAL EXAM
[FreeTextEntry1] : PE:\par Constitutional: In NAD, calm and cooperative\par HEENT: NCAT, Anicteric sclera, no lid-lag\par Cardio: Extremities appear pink and well perfused, no peripheral edema\par Respiratory: Normal respiratory effort on room air, no accessory muscle use\par Skin: no rashes seen on exposed skin, no visible abrasions\par Psych: Normal affect, intact judgment and insight\par Neuro: Awake, alert and oriented x 3, see below for focused neurological exam\par MSK (Neck):\par 	Inspection: no gross swelling identified\par 	Palpation: Mild Tenderness of the bilateral lower cervical paraspinals\par 	ROM: Pain at end cervical extension and right lateral rotation/sidebending\par 	Strength: 5/5 strength in bilateral upper extremities\par 	Reflexes: 2+ Biceps/Brachioradialis reflex bilaterally, Roque’s negative bilaterally\par 	Sensation: Intact to light touch in bilateral upper extremities\par 	Special tests: Spurling’s test negative bilaterally\par \par MSK (Low back):\par 	Inspection: no gross swelling identified\par 	Palpation: Tenderness of the bilateral, R>L, lower lumbar paraspinals\par 	ROM: Pain at end lumbar extension,  no pain with flexion\par 	Strength: 5/5 strength in bilateral lower extremities\par 	Reflexes: 2+ Patella reflex bilaterally, 1+ Achilles reflex bilaterally, negative clonus bilaterally\par 	Sensation: Intact to light touch in bilateral lower extremities\par 	Special tests: SLR negative bilaterally\par

## 2020-12-01 NOTE — ASSESSMENT
[FreeTextEntry1] : Mr. ANISA SANTOS is a 40 year old  male who presents with chronic neck and low back pain with radicular complaints but no signs on physical exam except for decreased Achilles reflexes bilaterally. Patient's pain is likely a combination of radiculopathies, myofascial pain and possible underlying rheumatological disorder that he is currently being worked up for. At this time will recommend:\par - Patient to start PT 2-3x/week for stretching, strengthening, ROM exercises, HEP and modalities PRN including myofascial release, moist heat, and TENS therapy \par - Will order MRI C-Spine and L-Spine as patient's pain has not improved with regular activity or NSAIDs. \par - Patient to f/u with his rheumatologist\par - RTC in 1 month

## 2020-12-01 NOTE — HISTORY OF PRESENT ILLNESS
[FreeTextEntry1] : Mr. ANISA SANTOS is a 40 year old  male who presents for follow up. He has seen an outside rheumatologist who ordered a lot of blood work that is currently pending. He has yet to start PT due to busy personal life but will be starting soon. Not much improvement with Mobic. 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 \par Quality:Constant aching\par Radiation:Low back into R>L thigh, both anterior and posterior, also R arm to R thumb but not as severe\par Severity:3/10, 8/10 at worst\par Timing:Not improving\par \par No bowel/bladder changes. No groin numbness.

## 2020-12-07 NOTE — H&P PST ADULT - NSICDXPROBLEM_GEN_ALL_CORE_FT
124
PROBLEM DIAGNOSES  Problem: Strain of muscle, fascia and tendon of other parts of biceps, left arm, initial encounter  Assessment and Plan: Scheduled for left distal biceps tendon reinsertion on 1/29/2020. cbc done - sent stat.  Verbal and written preop instruction given and explained. Patient verbalized understanding. Chlorhexidine antiseptic solution with written and verbal instruction given & Patient verbalized understanding with return demonstration. Famotidine provided with instruction. Patient verbalized understanding.

## 2020-12-09 ENCOUNTER — FORM ENCOUNTER (OUTPATIENT)
Age: 40
End: 2020-12-09

## 2020-12-10 ENCOUNTER — APPOINTMENT (OUTPATIENT)
Dept: MRI IMAGING | Facility: HOSPITAL | Age: 40
End: 2020-12-10
Payer: MEDICAID

## 2020-12-10 ENCOUNTER — OUTPATIENT (OUTPATIENT)
Dept: OUTPATIENT SERVICES | Facility: HOSPITAL | Age: 40
LOS: 1 days | End: 2020-12-10
Payer: MEDICAID

## 2020-12-10 DIAGNOSIS — S46.211A STRAIN OF MUSCLE, FASCIA AND TENDON OF OTHER PARTS OF BICEPS, RIGHT ARM, INITIAL ENCOUNTER: Chronic | ICD-10-CM

## 2020-12-10 DIAGNOSIS — Z98.890 OTHER SPECIFIED POSTPROCEDURAL STATES: Chronic | ICD-10-CM

## 2020-12-10 DIAGNOSIS — M47.816 SPONDYLOSIS WITHOUT MYELOPATHY OR RADICULOPATHY, LUMBAR REGION: ICD-10-CM

## 2020-12-10 DIAGNOSIS — M54.16 RADICULOPATHY, LUMBAR REGION: ICD-10-CM

## 2020-12-10 PROCEDURE — 72148 MRI LUMBAR SPINE W/O DYE: CPT | Mod: 26

## 2020-12-10 PROCEDURE — 72141 MRI NECK SPINE W/O DYE: CPT | Mod: 26

## 2020-12-10 PROCEDURE — 72141 MRI NECK SPINE W/O DYE: CPT

## 2020-12-10 PROCEDURE — 72148 MRI LUMBAR SPINE W/O DYE: CPT

## 2021-01-05 ENCOUNTER — APPOINTMENT (OUTPATIENT)
Dept: PHYSICAL MEDICINE AND REHAB | Facility: CLINIC | Age: 41
End: 2021-01-05
Payer: MEDICAID

## 2021-01-05 VITALS
HEART RATE: 70 BPM | TEMPERATURE: 97.8 F | SYSTOLIC BLOOD PRESSURE: 121 MMHG | OXYGEN SATURATION: 99 % | DIASTOLIC BLOOD PRESSURE: 81 MMHG

## 2021-01-05 PROCEDURE — 99214 OFFICE O/P EST MOD 30 MIN: CPT

## 2021-01-05 PROCEDURE — 99072 ADDL SUPL MATRL&STAF TM PHE: CPT

## 2021-01-05 RX ORDER — GABAPENTIN 100 MG/1
100 CAPSULE ORAL
Qty: 90 | Refills: 0 | Status: ACTIVE | COMMUNITY
Start: 2021-01-05 | End: 1900-01-01

## 2021-01-05 NOTE — ASSESSMENT
[FreeTextEntry1] : Mr. ANISA SANTOS is a 40 year old male who presents with both cervical and lumbar spondylosis with possible lumbar/cervical radiculopathy. At this time will recommend:\par - Patient to start PT, he hasn't been able to start due to time constraints\par - Will start patient on Gabapentin 100mg Qhs with a titration scheduled of up to 300mg Qhs. Advised patient on side effects from medication including sedation.\par - Due to severity of findings on both his cervical and lumbar MRIs, and given his age, will refer to Ortho Spine for surgical evaluation. \par - Briefly discussed injection therapy with patient but he would like to try the gabapentin first and see what surgery says. \par \par RTC in 1 month (after surgical consultation). Patient educated on red flag signs including any changes to his bowel/bladder control, groin numbness or new weakness. Patient knows to seek immediate attention by calling 911 or going to nearest ER if these symptoms appear.

## 2021-01-05 NOTE — HISTORY OF PRESENT ILLNESS
[FreeTextEntry1] : Mr. ANISA SANTOS is a 40 year old  male who presents for follow up. He is s/p MRI C and L Spines. 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 \par Quality:Constant aching\par Radiation:Low back into R>L thigh, both anterior and posterior, also R arm to R thumb but not as severe\par Severity:3/10, 8/10 at worst\par Timing:Not improving\par \par No bowel/bladder changes. No groin numbness. No weakness in arms/legs. No trips/falls.

## 2021-01-05 NOTE — DATA REVIEWED
[FreeTextEntry1] : MRI C Spine: multilevel spondylosis\par \par \par   MR Cervical Spine No Cont             Final\par \par No Documents Attached\par \par \par Result Annotated [id number] 12:12PM by JJ TURNER\par \par \par Will make f/u appointment for patient to discuss results\par \par \par   EXAM:  MR SPINE CERVICAL\par \par \par PROCEDURE DATE:  12/10/2020\par \par \par \par INTERPRETATION:  MRI cervical spine without contrast\par Comparison 04/27/18\par History chronic neck pain with right upper extremity radiculopathy\par \par There is cervical straightening without compression deformity or significant subluxation or marrow infiltration or edema. The spinal cord is normal in signal.\par \par There is mild discogenic sclerosis at the otherwise normal C2-3 level\par \par There is moderate degenerative loss of disc height and signal and mild broad dorsal osteophyte at C3-4. It exerts mild mass effect on the ventral thecal sac without final stenosis or cord impingement. There is bilateral uncinate hypertrophy contributing to mild bilateral foraminal stenosis\par \par Severe degenerative loss of disc height and signal and mild broad dorsal osteophyte involves C4-5 through C6-7 significant for the following:\par \par At C4-5 osteophyte encroaches on and mildly displaces the spinal cord predominantly on the right without noel compression. There is bilateral uncinate hypertrophy resulting in moderate left and severe right foraminal stenosis\par \par At C5-6 osteophyte mildly deforms the ventral thecal sac without spinal stenosis or cord impingement. Uncinate hypertrophy results in severe right and mild left foraminal stenosis\par \par At C6-7 osteophyte mildly deforms the ventral thecal sac, worse on the right without spinal stenosis or cord impingement. There is mild left and moderate right foraminal stenosis\par \par C7-T1 level is within normal limits\par \par IMPRESSION:\par Severe cervical spondylosis, particularly for age with mild spinal cord impingement on the right at C4-5. Multilevel predominantly right-sided foraminal stenosis, most severe at C4-5.\par \par \par \par \par \par \par \par PEARL PEREIRA MD; Attending Radiologist\par This document has been electronically signed. Dec 11 2020  8:43AM\par \par  \par \par  Ordered by: JJ TURNER       Collected/Examined: [id number] 06:27PM       \par Verified by: JJ TURNER [id number] 12:12PM       \par  Result Communication: No patient communication needed at this time;\par Stage: Final       \par  Performed at: [facility]       Resulted: [id number] 08:46AM       Last Updated: 11Dec2020 12:12PM       Accession: [id number]       \par \par MRI L Spine: multilevel spondylosis\par \par \par   MR Lumbar Spine No Cont             Final\par \par No Documents Attached\par \par \par Result Annotated 11Dec2020 12:12PM by JJ TURNER\par \par \par Will make f/u appointment for patient to discuss results\par \par \par   EXAM:  MR SPINE LUMBAR\par \par \par PROCEDURE DATE:  12/10/2020\par \par \par \par INTERPRETATION:  MRI lumbar spine without contrast\par \par Comparison 10/11/18\par \par History low back pain\par \par There is no compression deformity or subluxation or marrow infiltration or edema there are numerous tiny Schmorl's nodes. The conus is normal. There is slight mid lumbar levoscoliosis.\par \par There is severe degenerative loss of disc height and signal and mild to moderate disc osteophyte complex at L1-L2, slightly worse since the prior exam. There is mild mass effect on the ventral thecal sac resulting in mild central spinal stenosis without significant foraminal stenosis\par \par There is severe degenerative disc change and mild annular osteophyte complex at L2-L3, stable, mildly deforming the ventral thecal sac without spinal canal or foraminal stenosis\par \par There is severe degenerative disc change and mild annular osteophyte at L3-L4, slightly worse since the prior exam. There is mild mass effect on the ventral thecal sac without spinal canal or foraminal stenosis\par \par There is moderate degenerative loss of disc height and signal at L4-5, worse since the prior exam. It exerts negligible mass effect on the ventral thecal sac without spinal canal or foraminal stenosis\par \par There is mild to moderate degenerative loss of disc height and signal at L5-S1 without significant dorsal osteophyte or bulging or spinal canal or foraminal stenosis\par \par IMPRESSION:\par Worsening spondylosis and prior exam without compression fracture or focal disc protrusion. Mild spinal stenosis at L1-L2.\par \par \par \par \par \par \par \par PEARL PEREIRA MD; Attending Radiologist\par This document has been electronically signed. Dec 11 2020  8:54AM\par \par  \par \par  Ordered by: JJ TURNER       Collected/Examined: [id number] 06:27PM       \par Verified by: JJ TURNER [id number] 12:12PM       \par  Result Communication: No patient communication needed at this time;\par Stage: Final       \par  Performed at: [facility]       Resulted: [id number] 08:57AM       Last Updated: [id number] 12:12PM       Accession: [id number]

## 2021-01-15 ENCOUNTER — APPOINTMENT (OUTPATIENT)
Dept: ORTHOPEDIC SURGERY | Facility: CLINIC | Age: 41
End: 2021-01-15
Payer: MEDICAID

## 2021-01-15 VITALS — TEMPERATURE: 97.6 F

## 2021-01-15 DIAGNOSIS — M62.838 OTHER MUSCLE SPASM: ICD-10-CM

## 2021-01-15 DIAGNOSIS — Z78.9 OTHER SPECIFIED HEALTH STATUS: ICD-10-CM

## 2021-01-15 PROCEDURE — 72110 X-RAY EXAM L-2 SPINE 4/>VWS: CPT

## 2021-01-15 PROCEDURE — 99214 OFFICE O/P EST MOD 30 MIN: CPT

## 2021-01-15 PROCEDURE — 99072 ADDL SUPL MATRL&STAF TM PHE: CPT

## 2021-01-15 PROCEDURE — 72050 X-RAY EXAM NECK SPINE 4/5VWS: CPT

## 2021-01-15 NOTE — PHYSICAL EXAM
[de-identified] : Cervical Physical Exam\par \par Gait -normal\par \par Station -normal\par \par Sagittal balance -normal\par \par Compensatory mechanism? -None\par \par Horizontal gaze -maintain\par \par Heel walk -and normal\par \par Toe walk -normal\par \par Reflexes\par Biceps -normal\par Triceps -normal\par Brachioradialis -normal\par Patellar -normal\par Gastroc -normal\par Clonus -no\par \par Roque´s -none\par \par Shoulder Exam -normal, previous history of surgery with Dr. wick\par \par Spurling´s -positive on right\par \par Wrist Pulses -2+ radial/ulnar\par \par Foot Pulses -2+ DP/PT\par \par Cervical range of motion -globally reduced\par \par Sensation \par C5-T1 sensation intact to light touch bilaterally\par \par L1-S1 sensation intact to light touch bilaterally\par \par Motor\par \par \par 	Deltoid	Biceps	Triceps	WF	WE	IO	\par Right	5/5	5/5	5/5	5/5	5/5	5/5	5/5\par Left	5/5	5/5	5/5	5/5	5/5	5/5	5/5\par \par \par 	IP	Quad	HS	TA	Gastroc	EHL\par Right	5/5	5/5	5/5	5/5	5/5	5/5\par Left	5/5	5/5	5/5	5/5	5/5	5/5\par \par \par  [de-identified] : Cervical radiographs\par Cervical spondylosis noted\par Anterior osteophytes noted\par No obvious areas of instability on flexion-extension radiographs\par \par Cervical MRI reviewed\par There is right-sided disc osteophyte complex abutting the exiting C5 nerve root at C4-C5\par There is a right-sided disc osteophyte complex abutting the exiting C6 nerve root at C5-C6\par No severe canal stenosis\par No myelomalacia\par \par Lumbar radiographs\par No obvious instability on flexion-extension radiographs\par Lumbar spondylosis noted

## 2021-01-15 NOTE — HISTORY OF PRESENT ILLNESS
[de-identified] : This is a 40-year-old male that has been dealing with several years of worsening neck and low back pain.  He states both his neck and his back pain is severely debilitating at times.  He is a very active young man and he is able to do much of his activities of daily living although he does this with some degree of pain.  In terms of his neck he says he has 50% neck pain and 50% right arm pain.  His pain radiates down from his arm down his lateral shoulder into his forearm and into his hand.  His small finger and ring finger are especially bothersome for him.  He does feel like his right arm is somewhat weaker than his left.  He denies any balance issues.  He denies any hand dexterity issues.  He denies any issues buttoning his shirts.\par \par In terms of his back he describes 80% back and 20% leg pain.  When he does have leg pain it is over his right anterior and medial thigh he does occasionally get some left leg pain.

## 2021-01-15 NOTE — ASSESSMENT
[FreeTextEntry1] : I had a lengthy discussion with the patient regards to his treatment plan and diagnosis.  At this point he is displaying symptoms concerning for C5 and C6 cervical radiculopathy stemming from his disc herniations at the corresponding levels.  Therefore I think he would benefit from a C4-C5 C5-C6 interlaminar epidural steroid injection.  I have sent him to my colleague Dr. Jaramillo for this treatment.  I have also shown him isometric neck exercises that I think will help his symptoms.  I encouraged him to reach out if he has any new or worsening symptoms.  At this point I will see him back in 3 to 4 weeks after his injection.

## 2021-01-22 ENCOUNTER — APPOINTMENT (OUTPATIENT)
Dept: PHYSICAL MEDICINE AND REHAB | Facility: CLINIC | Age: 41
End: 2021-01-22
Payer: MEDICAID

## 2021-01-22 VITALS
OXYGEN SATURATION: 98 % | HEART RATE: 72 BPM | DIASTOLIC BLOOD PRESSURE: 74 MMHG | SYSTOLIC BLOOD PRESSURE: 112 MMHG | TEMPERATURE: 97 F

## 2021-01-22 PROCEDURE — 99072 ADDL SUPL MATRL&STAF TM PHE: CPT

## 2021-01-22 PROCEDURE — 99214 OFFICE O/P EST MOD 30 MIN: CPT

## 2021-01-25 NOTE — PHYSICAL EXAM
[FreeTextEntry1] : PE:\par Constitutional: In NAD, calm and cooperative\par HEENT: NCAT, Anicteric sclera, no lid-lag\par Cardio: Extremities appear pink and well perfused, no peripheral edema\par Respiratory: Normal respiratory effort on room air, no accessory muscle use\par Skin: no rashes seen on exposed skin, no visible abrasions\par Psych: Normal affect, intact judgment and insight\par Neuro: Awake, alert and oriented x 3, see below for focused neurological exam\par MSK (Neck):\par 	Inspection: no gross swelling identified\par 	Palpation: Mild Tenderness of the bilateral lower cervical paraspinals\par 	ROM: Pain at end cervical extension and right lateral rotation/sidebending\par 	Strength: 5/5 strength in bilateral upper extremities\par 	Reflexes: 2+ Biceps/Brachioradialis reflex bilaterally, Roque’s negative bilaterally\par 	Sensation: Intact to light touch in bilateral upper extremities\par \par MSK (Low back):\par 	Inspection: no gross swelling identified\par 	Palpation: Tenderness of the bilateral, R>L, lower lumbar paraspinals\par 	Strength: 5/5 gross strength in bilateral lower extremities\par 	Reflexes: 2+ Patella reflex bilaterally, 1+ Achilles reflex bilaterally, negative clonus bilaterally

## 2021-01-25 NOTE — ASSESSMENT
[FreeTextEntry1] : Mr. ANISA SANTOS is a 40 year old  male who presents with both neck/arm and low back/leg symptoms, consistent with both cervical and lumbar radiculopathy as well as spondylosis. Patient has no improved with conservative therapy. At this time will recommend: \par - Explained the R/B/A of a C7-T1 ILESI to patient including risk of bleeding, hematoma, paralysis, nerve damage for which he understands and would like to proceed. Will schedule patient a R C7-T1 ILESI. Patient aware of COVID test. \par \par Will return for procedure. \par

## 2021-01-25 NOTE — HISTORY OF PRESENT ILLNESS
[FreeTextEntry1] : Mr. ANIAS SANTOS is a 40 year old  male who presents for follow up. He has seen Dr. Mays who recommended an MARILIN. 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 \par Quality:Constant aching\par Radiation:Low back into R>L thigh, both anterior and posterior, also R arm to R thumb\par Severity:3/10, 8/10 at worst\par Timing:Not improving significantly with time\par \par No bowel/bladder changes. No groin numbness.

## 2021-02-05 DIAGNOSIS — Z01.818 ENCOUNTER FOR OTHER PREPROCEDURAL EXAMINATION: ICD-10-CM

## 2021-02-06 ENCOUNTER — APPOINTMENT (OUTPATIENT)
Dept: DISASTER EMERGENCY | Facility: CLINIC | Age: 41
End: 2021-02-06

## 2021-02-07 LAB — SARS-COV-2 N GENE NPH QL NAA+PROBE: NOT DETECTED

## 2021-02-08 ENCOUNTER — NON-APPOINTMENT (OUTPATIENT)
Age: 41
End: 2021-02-08

## 2021-02-09 ENCOUNTER — APPOINTMENT (OUTPATIENT)
Dept: PHYSICAL MEDICINE AND REHAB | Facility: CLINIC | Age: 41
End: 2021-02-09

## 2021-02-20 ENCOUNTER — APPOINTMENT (OUTPATIENT)
Dept: DISASTER EMERGENCY | Facility: CLINIC | Age: 41
End: 2021-02-20

## 2021-02-21 LAB — SARS-COV-2 N GENE NPH QL NAA+PROBE: NOT DETECTED

## 2021-02-23 ENCOUNTER — APPOINTMENT (OUTPATIENT)
Dept: PHYSICAL MEDICINE AND REHAB | Facility: CLINIC | Age: 41
End: 2021-02-23

## 2021-02-23 ENCOUNTER — OUTPATIENT (OUTPATIENT)
Dept: OUTPATIENT SERVICES | Facility: HOSPITAL | Age: 41
LOS: 1 days | End: 2021-02-23
Payer: MEDICAID

## 2021-02-23 DIAGNOSIS — S46.211A STRAIN OF MUSCLE, FASCIA AND TENDON OF OTHER PARTS OF BICEPS, RIGHT ARM, INITIAL ENCOUNTER: Chronic | ICD-10-CM

## 2021-02-23 DIAGNOSIS — M54.13 RADICULOPATHY, CERVICOTHORACIC REGION: ICD-10-CM

## 2021-02-23 DIAGNOSIS — M54.12 RADICULOPATHY, CERVICAL REGION: ICD-10-CM

## 2021-02-23 DIAGNOSIS — Z98.890 OTHER SPECIFIED POSTPROCEDURAL STATES: Chronic | ICD-10-CM

## 2021-02-23 PROCEDURE — 62321 NJX INTERLAMINAR CRV/THRC: CPT

## 2021-02-23 PROCEDURE — 62321 NJX INTERLAMINAR CRV/THRC: CPT | Mod: RT

## 2021-03-09 ENCOUNTER — APPOINTMENT (OUTPATIENT)
Dept: PHYSICAL MEDICINE AND REHAB | Facility: CLINIC | Age: 41
End: 2021-03-09
Payer: MEDICAID

## 2021-03-09 VITALS — DIASTOLIC BLOOD PRESSURE: 68 MMHG | HEART RATE: 77 BPM | SYSTOLIC BLOOD PRESSURE: 122 MMHG | OXYGEN SATURATION: 99 %

## 2021-03-09 VITALS — TEMPERATURE: 98 F

## 2021-03-09 PROCEDURE — 99214 OFFICE O/P EST MOD 30 MIN: CPT

## 2021-03-09 PROCEDURE — 99072 ADDL SUPL MATRL&STAF TM PHE: CPT

## 2021-03-09 NOTE — PHYSICAL EXAM
[FreeTextEntry1] : PE:\par Constitutional: In NAD, calm and cooperative\par HEENT: NCAT, Anicteric sclera, no lid-lag\par Cardio: Extremities appear pink and well perfused, no peripheral edema\par Respiratory: Normal respiratory effort on room air, no accessory muscle use\par Skin: no rashes seen on exposed skin, no visible abrasions\par Psych: Normal affect, intact judgment and insight\par Neuro: Awake, alert and oriented x 3, see below for focused neurological exam\par MSK (Neck):\par 	Inspection: no gross swelling identified, no erythema around injection site\par 	Palpation: Mild Tenderness of the bilateral lower cervical paraspinals\par 	ROM: Pain at end cervical extension and right lateral rotation/sidebending\par 	Strength: 5/5 strength in bilateral upper extremities\par 	Reflexes: 2+ Biceps/Brachioradialis reflex bilaterally, Roque’s negative bilaterally\par 	Sensation: Intact to light touch in bilateral upper extremities\par \par MSK (Low back):\par 	Inspection: no gross swelling identified\par 	Palpation: Tenderness of the bilateral, R>L, lower lumbar paraspinals\par 	Strength: 5/5 gross strength in bilateral lower extremities\par 	Reflexes: 2+ Patella reflex bilaterally, 1+ Achilles reflex bilaterally, negative clonus bilaterally

## 2021-03-09 NOTE — HISTORY OF PRESENT ILLNESS
[FreeTextEntry1] : Mr. ANISA SANTOS is a 40 year old  male who presents for follow up. Patient s/p C7-T1 ILESI on 2/23/21. Patient said he got mild relief from the epidural but did not notice a huge difference in pain relief. He also said it might have improved the numbness in the R arm but he isn't sure. Denies any complications or side effects from procedure. 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 \par Quality:Constant aching\par Radiation:Low back into R>L thigh, both anterior and posterior, also R arm to R thumb\par Severity:3/10, 8/10 at worst\par Timing:Not improving significantly with time\par \par No bowel/bladder changes. No groin numbness.

## 2021-03-09 NOTE — ASSESSMENT
[FreeTextEntry1] : Mr. ANISA SANTOS is a 40 year old  male who presents with both neck/arm and low back/leg symptoms, consistent with both cervical and lumbar radiculopathy as well as spondylosis. Patient has no improved with conservative therapy. He had only minor relief with recent MARILIN. At this time will recommend: \par - Patient to follow up with Dr. Mays later this week regarding surgical options\par - Will continue PT in the meantime\par - Gave refill of Mobic 7.5mg BID. Patient aware of cardiac/gi/renal side effects. Patient encouraged to take medication with food. \par \par RTC in 4-6 weeks. Patient aware of red flag signs including any changes to their bowel/bladder control, groin numbness or new weakness. Patient knows to seek immediate attention by calling 911 or going to nearest ER if these symptoms appear. \par

## 2021-03-12 ENCOUNTER — APPOINTMENT (OUTPATIENT)
Dept: ORTHOPEDIC SURGERY | Facility: CLINIC | Age: 41
End: 2021-03-12
Payer: MEDICAID

## 2021-03-12 DIAGNOSIS — M54.12 RADICULOPATHY, CERVICAL REGION: ICD-10-CM

## 2021-03-12 DIAGNOSIS — M47.812 SPONDYLOSIS W/OUT MYELOPATHY OR RADICULOPATHY, CERVICAL REGION: ICD-10-CM

## 2021-03-12 DIAGNOSIS — M54.16 RADICULOPATHY, LUMBAR REGION: ICD-10-CM

## 2021-03-12 DIAGNOSIS — M47.816 SPONDYLOSIS W/OUT MYELOPATHY OR RADICULOPATHY, LUMBAR REGION: ICD-10-CM

## 2021-03-12 PROCEDURE — 99214 OFFICE O/P EST MOD 30 MIN: CPT

## 2021-03-12 PROCEDURE — 99072 ADDL SUPL MATRL&STAF TM PHE: CPT

## 2021-03-12 RX ORDER — OMEPRAZOLE 20 MG/1
20 TABLET, DELAYED RELEASE ORAL
Qty: 30 | Refills: 0 | Status: ACTIVE | COMMUNITY
Start: 2021-02-02

## 2021-03-12 RX ORDER — FERROUS SULFATE TAB EC 324 MG (65 MG FE EQUIVALENT) 324 (65 FE) MG
324 (65 FE) TABLET DELAYED RESPONSE ORAL
Qty: 30 | Refills: 0 | Status: ACTIVE | COMMUNITY
Start: 2021-01-15

## 2021-03-12 RX ORDER — PNV NO.153/FA/OM3/DHA/EPA/FISH 400-35-25
125 MCG TABLET,CHEWABLE ORAL
Qty: 30 | Refills: 0 | Status: ACTIVE | COMMUNITY
Start: 2021-01-15

## 2021-03-12 NOTE — PHYSICAL EXAM
[de-identified] : Cervical Physical Exam\par \par Gait -normal\par \par Station -normal\par \par Sagittal balance -normal\par \par Compensatory mechanism? -None\par \par Horizontal gaze -maintain\par \par Heel walk -and normal\par \par Toe walk -normal\par \par Reflexes\par Biceps -normal\par Triceps -normal\par Brachioradialis -normal\par Patellar -normal\par Gastroc -normal\par Clonus -no\par \par Roque´s -none\par \par Shoulder Exam -normal, previous history of surgery with Dr. wick\par \par Spurling´s -positive on right\par \par Wrist Pulses -2+ radial/ulnar\par \par Foot Pulses -2+ DP/PT\par \par Cervical range of motion -globally reduced\par \par Sensation \par C5-T1 sensation intact to light touch bilaterally\par \par L1-S1 sensation intact to light touch bilaterally\par \par Motor\par \par \par 	Deltoid	Biceps	Triceps	WF	WE	IO	\par Right	5/5	5/5	5/5	5/5	5/5	5/5	5/5\par Left	5/5	5/5	5/5	5/5	5/5	5/5	5/5\par \par \par 	IP	Quad	HS	TA	Gastroc	EHL\par Right	5/5	5/5	5/5	5/5	5/5	5/5\par Left	5/5	5/5	5/5	5/5	5/5	5/5\par \par Please note the patient is a strong man and although I was not able to break the patient in terms of his upper extremity motor exam the patient does endorse feeling weaker when performing activities using his right hand/arm.\par  [de-identified] : Cervical radiographs\par Cervical spondylosis noted\par Anterior osteophytes noted\par No obvious areas of instability on flexion-extension radiographs\par \par Cervical MRI reviewed\par There is right-sided disc osteophyte complex abutting the exiting C5 nerve root at C4-C5\par There is a right-sided disc osteophyte complex abutting the exiting C6 nerve root at C5-C6\par C6-C7 with right-sided foraminal stenosis\par No severe canal stenosis\par No myelomalacia\par \par Lumbar radiographs\par No obvious instability on flexion-extension radiographs\par Lumbar spondylosis noted\par \par We did go over the patient's MRI 1 more time.  He does have significant foraminal stenosis on the right.

## 2021-03-12 NOTE — ASSESSMENT
[FreeTextEntry1] : This is a 40-year-old male here today for evaluation of his cervical radicular pain.  He has tried epidural steroid injection, physical therapy, activity modifications, NSAIDs, Tylenol but unfortunately he is still having significant pain and symptoms.  At this point given his significant MRI findings of foraminal stenosis, right arm radicular pain I do think it is reasonable to proceed with a C4-C7 ACDF.  The patient would like to hold off on this until the summer.  I think this is absolutely reasonable given the fact that he has no signs or symptoms concerning for myelopathy.  He is dealing with a largely radicular problem.  I explained to the patient that we will likely need a new MRI if we are going to proceed with any surgical plan in the summer and we will also likely need a CT scan.  He was in agreement with this plan.  He will likely make an appointment for May or June to discuss next steps in terms of treatment.  I encouraged him to reach out sooner if he has any new or worsening symptoms.  I am available anytime if his symptoms do come up.  I also gave him my direct contact information to discuss further treatment.  Please note that over 30 minutes of time was spent in care of this patient which includes previsit preparation, in person visit as well as post visit documentation.\par

## 2021-03-12 NOTE — HISTORY OF PRESENT ILLNESS
[de-identified] : This is a 40-year-old male that has been dealing with several years of worsening neck and low back pain.  He states both his neck and his back pain is severely debilitating at times.  He is a very active young man and he is able to do much of his activities of daily living although he does this with some degree of pain.  In terms of his neck he says he has 50% neck pain and 50% right arm pain.  His pain radiates down from his arm down his lateral shoulder into his forearm and into his hand.  His small finger and ring finger are especially bothersome for him.  He does feel like his right arm is somewhat weaker than his left.  He denies any balance issues.  He denies any hand dexterity issues.  He denies any issues buttoning his shirts.\par \par The above history is from the patient's previous examination\par \par Since her last visit the patient had an epidural steroid injection.  Unfortunately he is still having significant weakness in pain down his right arm despite this epidural.  He has been diligently doing his home exercise program and physical therapy.  Unfortunately he continues to have symptoms.  He denies any issues with with balance, buttoning his shirts, hand dexterity, bowel bladder issues, saddle anesthesia.  He is here today to discuss his treatment options.\par \par In terms of his back he describes 80% back and 20% leg pain.  When he does have leg pain it is over his right anterior and medial thigh he does occasionally get some left leg pain.

## 2021-03-15 PROBLEM — M54.16 LUMBAR RADICULOPATHY: Status: ACTIVE | Noted: 2020-11-03

## 2021-03-15 PROBLEM — M47.816 LUMBAR SPONDYLOSIS: Status: ACTIVE | Noted: 2020-12-01

## 2021-03-15 PROBLEM — M47.812 CERVICAL SPONDYLOSIS: Status: ACTIVE | Noted: 2021-01-05

## 2021-03-15 PROBLEM — M54.12 CERVICAL RADICULOPATHY: Status: ACTIVE | Noted: 2018-05-30

## 2021-03-22 ENCOUNTER — OUTPATIENT (OUTPATIENT)
Dept: OUTPATIENT SERVICES | Facility: HOSPITAL | Age: 41
LOS: 1 days | End: 2021-03-22
Payer: MEDICAID

## 2021-03-22 ENCOUNTER — APPOINTMENT (OUTPATIENT)
Dept: MRI IMAGING | Facility: HOSPITAL | Age: 41
End: 2021-03-22
Payer: MEDICAID

## 2021-03-22 DIAGNOSIS — Z98.890 OTHER SPECIFIED POSTPROCEDURAL STATES: Chronic | ICD-10-CM

## 2021-03-22 DIAGNOSIS — S46.211A STRAIN OF MUSCLE, FASCIA AND TENDON OF OTHER PARTS OF BICEPS, RIGHT ARM, INITIAL ENCOUNTER: Chronic | ICD-10-CM

## 2021-03-22 DIAGNOSIS — M25.561 PAIN IN RIGHT KNEE: ICD-10-CM

## 2021-03-22 PROCEDURE — 73721 MRI JNT OF LWR EXTRE W/O DYE: CPT

## 2021-03-22 PROCEDURE — 73721 MRI JNT OF LWR EXTRE W/O DYE: CPT | Mod: 26,RT

## 2021-04-05 ENCOUNTER — RX RENEWAL (OUTPATIENT)
Age: 41
End: 2021-04-05

## 2021-05-03 ENCOUNTER — RX RENEWAL (OUTPATIENT)
Age: 41
End: 2021-05-03

## 2021-05-03 RX ORDER — MELOXICAM 7.5 MG/1
7.5 TABLET ORAL DAILY
Qty: 60 | Refills: 0 | Status: ACTIVE | COMMUNITY
Start: 2020-11-03 | End: 1900-01-01
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
## 2021-11-14 NOTE — BRIEF OPERATIVE NOTE - VENOUS THROMBOEMBOLISM PROPHYLAXIS THERAPY
Care Management Follow Up    Length of Stay (days): 4    Expected Discharge Date: 11/16/2021     Concerns to be Addressed: Care progression      Patient plan of care discussed at interdisciplinary rounds: Yes    Anticipated Discharge Disposition:  Transitional Care Facility;home with assist;home with home care physical therapy    Referrals Placed by CM/SW: Yes    Additional Information:  Chart review. Covid progression. TCU rec. Referral placed.    JESSICA Martinez  11/14/21           VD

## 2022-03-08 NOTE — H&P PST ADULT - NS SC CAGE ALCOHOL EYE OPENER
Marija called to schedule Raoul's 6 month follow up for 6/22/22. She is wondering if an order for a PSA can be put in for his appt that same day.   no

## 2022-04-24 NOTE — H&P PST ADULT - NECK DETAILS
[facility] EMERGENCY DEPT  [address] [address] [phone]    Work/School Note    Date: 4/24/2022    To Whom It May concern:    Latoya Clemens was seen and treated today in the emergency room by the following provider(s):  Attending Provider: Arminda Weiss MD.      Latoya Clemens is excused from work/school on 04/24/22 and 04/25/22. She is medically clear to return to work/school on 4/26/2022.        Sincerely,          Pauline Kayser no JVD/supple

## 2022-05-05 NOTE — H&P PST ADULT - ANESTHESIA, PREVIOUS REACTION, PROFILE
denies family hx/none Stelara Counseling:  I discussed with the patient the risks of ustekinumab including but not limited to immunosuppression, malignancy, posterior leukoencephalopathy syndrome, and serious infections.  The patient understands that monitoring is required including a PPD at baseline and must alert us or the primary physician if symptoms of infection or other concerning signs are noted.

## 2024-02-04 ENCOUNTER — EMERGENCY (EMERGENCY)
Facility: HOSPITAL | Age: 44
LOS: 1 days | Discharge: HOME | End: 2024-02-04
Attending: EMERGENCY MEDICINE | Admitting: EMERGENCY MEDICINE
Payer: MEDICAID

## 2024-02-04 VITALS
TEMPERATURE: 98 F | HEIGHT: 68 IN | DIASTOLIC BLOOD PRESSURE: 76 MMHG | RESPIRATION RATE: 18 BRPM | WEIGHT: 184.97 LBS | OXYGEN SATURATION: 100 % | SYSTOLIC BLOOD PRESSURE: 143 MMHG | HEART RATE: 73 BPM

## 2024-02-04 DIAGNOSIS — S46.211A STRAIN OF MUSCLE, FASCIA AND TENDON OF OTHER PARTS OF BICEPS, RIGHT ARM, INITIAL ENCOUNTER: Chronic | ICD-10-CM

## 2024-02-04 DIAGNOSIS — Z98.890 OTHER SPECIFIED POSTPROCEDURAL STATES: Chronic | ICD-10-CM

## 2024-02-04 LAB
ALBUMIN SERPL ELPH-MCNC: 4.3 G/DL — SIGNIFICANT CHANGE UP (ref 3.3–5)
ALP SERPL-CCNC: 60 U/L — SIGNIFICANT CHANGE UP (ref 40–120)
ALT FLD-CCNC: 21 U/L — SIGNIFICANT CHANGE UP (ref 10–45)
ANION GAP SERPL CALC-SCNC: 12 MMOL/L — SIGNIFICANT CHANGE UP (ref 5–17)
APPEARANCE UR: ABNORMAL
AST SERPL-CCNC: 19 U/L — SIGNIFICANT CHANGE UP (ref 10–40)
BASOPHILS # BLD AUTO: 0.06 K/UL — SIGNIFICANT CHANGE UP (ref 0–0.2)
BASOPHILS NFR BLD AUTO: 1 % — SIGNIFICANT CHANGE UP (ref 0–2)
BILIRUB SERPL-MCNC: 0.6 MG/DL — SIGNIFICANT CHANGE UP (ref 0.2–1.2)
BILIRUB UR-MCNC: NEGATIVE — SIGNIFICANT CHANGE UP
BUN SERPL-MCNC: 22 MG/DL — SIGNIFICANT CHANGE UP (ref 7–23)
CALCIUM SERPL-MCNC: 9.7 MG/DL — SIGNIFICANT CHANGE UP (ref 8.4–10.5)
CHLORIDE SERPL-SCNC: 107 MMOL/L — SIGNIFICANT CHANGE UP (ref 96–108)
CO2 SERPL-SCNC: 26 MMOL/L — SIGNIFICANT CHANGE UP (ref 22–31)
COLOR SPEC: YELLOW — SIGNIFICANT CHANGE UP
CREAT SERPL-MCNC: 1.14 MG/DL — SIGNIFICANT CHANGE UP (ref 0.5–1.3)
DIFF PNL FLD: ABNORMAL
EGFR: 83 ML/MIN/1.73M2 — SIGNIFICANT CHANGE UP
EOSINOPHIL # BLD AUTO: 0.12 K/UL — SIGNIFICANT CHANGE UP (ref 0–0.5)
EOSINOPHIL NFR BLD AUTO: 2 % — SIGNIFICANT CHANGE UP (ref 0–6)
EPI CELLS # UR: 0 — SIGNIFICANT CHANGE UP
GLUCOSE SERPL-MCNC: 100 MG/DL — HIGH (ref 70–99)
GLUCOSE UR QL: NEGATIVE MG/DL — SIGNIFICANT CHANGE UP
HCT VFR BLD CALC: 42 % — SIGNIFICANT CHANGE UP (ref 39–50)
HGB BLD-MCNC: 14 G/DL — SIGNIFICANT CHANGE UP (ref 13–17)
KETONES UR-MCNC: NEGATIVE MG/DL — SIGNIFICANT CHANGE UP
LEUKOCYTE ESTERASE UR-ACNC: NEGATIVE — SIGNIFICANT CHANGE UP
LYMPHOCYTES # BLD AUTO: 1.73 K/UL — SIGNIFICANT CHANGE UP (ref 1–3.3)
LYMPHOCYTES # BLD AUTO: 30 % — SIGNIFICANT CHANGE UP (ref 13–44)
MANUAL SMEAR VERIFICATION: SIGNIFICANT CHANGE UP
MCHC RBC-ENTMCNC: 24.6 PG — LOW (ref 27–34)
MCHC RBC-ENTMCNC: 33.3 GM/DL — SIGNIFICANT CHANGE UP (ref 32–36)
MCV RBC AUTO: 73.9 FL — LOW (ref 80–100)
MICROCYTES BLD QL: SLIGHT — SIGNIFICANT CHANGE UP
MONOCYTES # BLD AUTO: 0.63 K/UL — SIGNIFICANT CHANGE UP (ref 0–0.9)
MONOCYTES NFR BLD AUTO: 11 % — SIGNIFICANT CHANGE UP (ref 2–14)
NEUTROPHILS # BLD AUTO: 3 K/UL — SIGNIFICANT CHANGE UP (ref 1.8–7.4)
NEUTROPHILS NFR BLD AUTO: 52 % — SIGNIFICANT CHANGE UP (ref 43–77)
NITRITE UR-MCNC: NEGATIVE — SIGNIFICANT CHANGE UP
NRBC # BLD: 0 /100 WBCS — SIGNIFICANT CHANGE UP (ref 0–0)
PH UR: 5.5 — SIGNIFICANT CHANGE UP (ref 5–8)
PLAT MORPH BLD: NORMAL — SIGNIFICANT CHANGE UP
PLATELET # BLD AUTO: 285 K/UL — SIGNIFICANT CHANGE UP (ref 150–400)
POTASSIUM SERPL-MCNC: 3.8 MMOL/L — SIGNIFICANT CHANGE UP (ref 3.5–5.3)
POTASSIUM SERPL-SCNC: 3.8 MMOL/L — SIGNIFICANT CHANGE UP (ref 3.5–5.3)
PROT SERPL-MCNC: 7.9 G/DL — SIGNIFICANT CHANGE UP (ref 6–8.3)
PROT UR-MCNC: 30 MG/DL
RBC # BLD: 5.68 M/UL — SIGNIFICANT CHANGE UP (ref 4.2–5.8)
RBC # FLD: 14.6 % — HIGH (ref 10.3–14.5)
RBC BLD AUTO: ABNORMAL
RBC CASTS # UR COMP ASSIST: 45 /HPF — HIGH (ref 0–4)
SODIUM SERPL-SCNC: 145 MMOL/L — SIGNIFICANT CHANGE UP (ref 135–145)
SP GR SPEC: 1.02 — SIGNIFICANT CHANGE UP (ref 1–1.03)
UROBILINOGEN FLD QL: 1 MG/DL — SIGNIFICANT CHANGE UP (ref 0.2–1)
VARIANT LYMPHS # BLD: 4 % — SIGNIFICANT CHANGE UP (ref 0–6)
WBC # BLD: 5.77 K/UL — SIGNIFICANT CHANGE UP (ref 3.8–10.5)
WBC # FLD AUTO: 5.77 K/UL — SIGNIFICANT CHANGE UP (ref 3.8–10.5)
WBC UR QL: 2 /HPF — SIGNIFICANT CHANGE UP (ref 0–5)

## 2024-02-04 PROCEDURE — 99284 EMERGENCY DEPT VISIT MOD MDM: CPT | Mod: 25

## 2024-02-04 PROCEDURE — 80053 COMPREHEN METABOLIC PANEL: CPT

## 2024-02-04 PROCEDURE — 85025 COMPLETE CBC W/AUTO DIFF WBC: CPT

## 2024-02-04 PROCEDURE — 99285 EMERGENCY DEPT VISIT HI MDM: CPT

## 2024-02-04 PROCEDURE — 87086 URINE CULTURE/COLONY COUNT: CPT

## 2024-02-04 PROCEDURE — 81001 URINALYSIS AUTO W/SCOPE: CPT

## 2024-02-04 PROCEDURE — 96374 THER/PROPH/DIAG INJ IV PUSH: CPT

## 2024-02-04 PROCEDURE — 96375 TX/PRO/DX INJ NEW DRUG ADDON: CPT

## 2024-02-04 PROCEDURE — 74176 CT ABD & PELVIS W/O CONTRAST: CPT | Mod: 26,MA

## 2024-02-04 PROCEDURE — 36415 COLL VENOUS BLD VENIPUNCTURE: CPT

## 2024-02-04 PROCEDURE — 74176 CT ABD & PELVIS W/O CONTRAST: CPT | Mod: MA

## 2024-02-04 PROCEDURE — 96361 HYDRATE IV INFUSION ADD-ON: CPT

## 2024-02-04 RX ORDER — SODIUM CHLORIDE 9 MG/ML
1000 INJECTION INTRAMUSCULAR; INTRAVENOUS; SUBCUTANEOUS ONCE
Refills: 0 | Status: COMPLETED | OUTPATIENT
Start: 2024-02-04 | End: 2024-02-04

## 2024-02-04 RX ORDER — OXYCODONE HYDROCHLORIDE 5 MG/1
1 TABLET ORAL
Qty: 12 | Refills: 0
Start: 2024-02-04 | End: 2024-02-06

## 2024-02-04 RX ORDER — MORPHINE SULFATE 50 MG/1
4 CAPSULE, EXTENDED RELEASE ORAL ONCE
Refills: 0 | Status: DISCONTINUED | OUTPATIENT
Start: 2024-02-04 | End: 2024-02-04

## 2024-02-04 RX ORDER — ONDANSETRON 8 MG/1
4 TABLET, FILM COATED ORAL ONCE
Refills: 0 | Status: COMPLETED | OUTPATIENT
Start: 2024-02-04 | End: 2024-02-04

## 2024-02-04 RX ORDER — KETOROLAC TROMETHAMINE 30 MG/ML
15 SYRINGE (ML) INJECTION ONCE
Refills: 0 | Status: DISCONTINUED | OUTPATIENT
Start: 2024-02-04 | End: 2024-02-04

## 2024-02-04 RX ORDER — TAMSULOSIN HYDROCHLORIDE 0.4 MG/1
1 CAPSULE ORAL
Qty: 3 | Refills: 0
Start: 2024-02-04 | End: 2024-02-06

## 2024-02-04 RX ORDER — ONDANSETRON 8 MG/1
1 TABLET, FILM COATED ORAL
Qty: 9 | Refills: 0
Start: 2024-02-04 | End: 2024-02-06

## 2024-02-04 RX ADMIN — MORPHINE SULFATE 4 MILLIGRAM(S): 50 CAPSULE, EXTENDED RELEASE ORAL at 11:24

## 2024-02-04 RX ADMIN — MORPHINE SULFATE 4 MILLIGRAM(S): 50 CAPSULE, EXTENDED RELEASE ORAL at 10:52

## 2024-02-04 RX ADMIN — Medication 15 MILLIGRAM(S): at 11:35

## 2024-02-04 RX ADMIN — Medication 1 TABLET(S): at 13:27

## 2024-02-04 RX ADMIN — SODIUM CHLORIDE 1000 MILLILITER(S): 9 INJECTION INTRAMUSCULAR; INTRAVENOUS; SUBCUTANEOUS at 13:28

## 2024-02-04 RX ADMIN — ONDANSETRON 4 MILLIGRAM(S): 8 TABLET, FILM COATED ORAL at 10:52

## 2024-02-04 RX ADMIN — Medication 15 MILLIGRAM(S): at 12:00

## 2024-02-04 RX ADMIN — SODIUM CHLORIDE 2000 MILLILITER(S): 9 INJECTION INTRAMUSCULAR; INTRAVENOUS; SUBCUTANEOUS at 10:52

## 2024-02-04 NOTE — ED PROVIDER NOTE - CARE PROVIDER_API CALL
Ceci Sanchez  Urology  [address] [place] [address], [address] [address]  Phone: [phone]  Fax: [phone]  Follow Up Time:

## 2024-02-04 NOTE — ED PROVIDER NOTE - PATIENT PORTAL LINK FT
You can access the FollowMyHealth Patient Portal offered by [facility] by registering at the following website: [email or web address]. By joining [facility] FollowMyHealth portal, you will also be able to view your health information using other applications (apps) compatible with our system.

## 2024-02-04 NOTE — ED ADULT TRIAGE NOTE - CHIEF COMPLAINT QUOTE
C/o left sided flank pain radiating to left side abdomen and testicles since this morning. +nausea. No vomiting. Denies fevers/chills. Denies hematuria or dysuria.

## 2024-02-04 NOTE — ED PROVIDER NOTE - NS ED ROS FT
Gen:  alert, awake, pt appears uncomfortable  Head:  atraumatic, normocephalic  HEENT: PERRLA, EOMI, normal nose, normal oropharynx, no tonsillar edema, erythema, or exudate  CV:  rrr, nl S1, S2, no m/r/g  Pulm:  lungs CTA b/l  Abd: s/nt/nd, +BS, no testicular mass or ttp, b/l intact cremasteric reflex, normal testicular lie, no penile lesions or d/c from meatus  MSK:  moving all extremities, no back midline ttp, no stepoffs, +left cva TTP  Neuro:  grossly intact, no focal deficits  Skin:  clear, dry, intact  Psych: AOx3, normal affect, no apparent risk to self or others

## 2024-02-04 NOTE — ED ADULT NURSE NOTE - CAS DISCH TRANSFER METHOD
----- Message from Kirill Ramirez MD sent at 1/29/2024  4:28 PM EST -----  Regarding: FW: Still sick  Contact: [phone]  Can you have see if we can get her scheduled for the CT sinuses sooner.  This is most likely an infection and cannot wait 2 weeks.  ----- Message -----  From: Susie Lira CMA  Sent: 1/29/2024   4:27 PM EST  To: Kirill Ramirez MD  Subject: FW: Still sick                                     ----- Message -----  From: Nelda Hewitt  Sent: 1/29/2024   4:21 PM EST  To: [facility] [place] [address] Clinical Support Staff  Subject: Still sick                                       I tried to get in for Ct and the earliest is Feb 12.  Thinking that will not do any good for me at this point.         Private car

## 2024-02-04 NOTE — ED PROVIDER NOTE - NSFOLLOWUPINSTRUCTIONS_ED_ALL_ED_FT
Take antibiotic, Bactrim, 1 tablet by mouth twice daily for 7 days  Take Flomax 1 tablet at night for 3 days  Take pain medication as needed 1 tablet every 6 hours for pain  Follow-up with urology Dr. Sanchez in 1-2 days, call for appointment  Return to emergency department if fever, vomiting, worsening pain, unable to tolerate medication, or other symptoms  use strainer for urination and bring stone to urologist    Kidney Stones    WHAT YOU NEED TO KNOW:    What is a kidney stone? Kidney stones form in the urinary system when the water and waste in your urine are out of balance. When this happens, certain types of waste crystals separate from the urine. The crystals build up and form kidney stones. Kidney stones can be made of uric acid, calcium, phosphate, or oxalate crystals. You may have more than one kidney stone.  Kidney Stones     What increases my risk for kidney stones?    Not drinking enough liquids (especially water) each day    Having urinary tract infections often    Too much of certain foods, such as meat, salt, nuts, and chocolate    Obesity    Certain medicines, such as diuretics, steroids, and antacids    A family history of kidney stones    Being born with a kidney or bowel disorder  What are the signs and symptoms of kidney stones?    Pain in the middle of your back that moves across to your side or that may spread to your groin    Nausea and vomiting    Urge to urinate often, burning feeling when you urinate, or pink or red urine    Tenderness in your lower back, side, or stomach  How are kidney stones diagnosed? Your healthcare provider will ask about your health and usual foods. He or she may refer you to a urologist. You may need tests to find out what type of kidney stones you have. Tests can show the size of your kidney stones and where they are in your urinary system. You may need more than one of the following:    Urine tests may show if you have blood in your urine. They may also show high amounts of the substances that form kidney stones, such as uric acid.    Blood tests show how well your kidneys are working. They may also be used to check the levels of calcium or uric acid in your blood.    X-ray or ultrasound pictures may be taken of your kidneys, bladder, and ureters. You may be given contrast liquid before an x-ray to help these show up better in the pictures. You may need to have more than one x-ray. Tell the healthcare provider if you have ever had an allergic reaction to contrast liquid.  How are kidney stones treated?    NSAIDs, such as ibuprofen, help decrease swelling, pain, and fever. This medicine is available with or without a doctor's order. NSAIDs can cause stomach bleeding or kidney problems in certain people. If you take blood thinner medicine, always ask your healthcare provider if NSAIDs are safe for you. Always read the medicine label and follow directions.    Acetaminophen decreases pain and fever. It is available without a doctor's order. Ask how much to take and how often to take it. Follow directions. Read the labels of all other medicines you are using to see if they also contain acetaminophen, or ask your doctor or pharmacist. Acetaminophen can cause liver damage if not taken correctly.    Prescription pain medicine may be given. Ask your healthcare provider how to take this medicine safely. Some prescription pain medicines contain acetaminophen. Do not take other medicines that contain acetaminophen without talking to your healthcare provider. Too much acetaminophen may cause liver damage. Prescription pain medicine may cause constipation. Ask your healthcare provider how to prevent or treat constipation.    Medicines to balance your electrolytes may be needed.    A procedure or surgery to remove the kidney stones may be needed if they do not pass on their own. Your treatment will depend on the size and location of your kidney stones.  What can I do to manage kidney stones?    Drink more liquids. Your healthcare provider may tell you to drink at least 8 to 12 (eight-ounce) cups of liquids each day. This helps flush out the kidney stones when you urinate. Water is the best liquid to drink.    Strain your urine every time you go to the bathroom. Urinate through a strainer or a piece of thin cloth to catch the stones. Take the stones to your healthcare provider so they can be sent to the lab for tests. This will help your healthcare providers plan the best treatment for you.  Look for Stones in the Filter      Ask if you should avoid any foods. You may need to limit oxalate. Oxalate is a chemical found in some plant foods. The most common type of kidney stone is made up of crystals that contain calcium and oxalate. Your healthcare provider or dietitian may recommend that you limit oxalate if you get this type of kidney stone often. You may need to limit how much sodium (salt) or protein you eat. Ask for information about the best foods for you.  High Oxalate Foods      Be physically active as directed. Your stones may pass more easily if you stay active. Physical activity can also help you manage your weight. Ask about the best activities for you.   Family Walking for Exercise  When should I seek immediate care?    You are vomiting and it is not relieved with medicine.    When should I call my doctor?    You have a fever.    You have trouble urinating.    You see blood in your urine.    You have severe pain.    You have any questions or concerns about your condition or care.

## 2024-02-04 NOTE — ED PROVIDER NOTE - CLINICAL SUMMARY MEDICAL DECISION MAKING FREE TEXT BOX
Patient with past medical history of low back pain in the past.  Presents with 2-hour of sudden onset left flank pain radiating into the left groin.  Pain sharp severe and constant.  No alleviating or exacerbating factors.  Has nausea no emesis.  No fevers or chills.  No hematuria or dysuria urgency or frequency.  No penile discharge or pain.  Has not taken anything for pain.    cbc, cmp, ua, culture, ivf, analgesia, antiemetics, ct stone hunt, re-assess Patient with past medical history of low back pain in the past.  Presents with 2-hour of sudden onset left flank pain radiating into the left groin.  Pain sharp severe and constant.  No alleviating or exacerbating factors.  Has nausea no emesis.  No fevers or chills.  No hematuria or dysuria urgency or frequency.  No penile discharge or pain.  Has not taken anything for pain.    cbc, cmp, ua, culture, ivf, analgesia, antiemetics, ct stone hunt, re-assess  Patient feeling better  Results of CT and labs were discussed with patient, stable for discharge with pain medication antibiotics strainer and urology follow-up

## 2024-02-04 NOTE — ED PROVIDER NOTE - PHYSICAL EXAMINATION
Gen:  alert, awake, appears uncomfortable, pacing  Head:  atraumatic, normocephalic  HEENT: PERRLA, EOMI, normal nose, normal oropharynx, no tonsillar edema, erythema, or exudate  CV:  rrr, nl S1, S2, no m/r/g  Pulm:  lungs CTA b/l  Abd: s/nt/nd, +BS  MSK:  moving all extremities, no back midline ttp, no stepoffs, +left cva TTP  Neuro:  grossly intact, no focal deficits  Skin:  clear, dry, intact  Psych: AOx3, normal affect, no apparent risk to self or others

## 2024-02-04 NOTE — ED PROVIDER NOTE - OBJECTIVE STATEMENT
Patient with past medical history of low back pain in the past.  Presents with 2-hour of sudden onset left flank pain radiating into the left groin.  Pain sharp severe and constant.  No alleviating or exacerbating factors.  Has nausea no emesis.  No fevers or chills.  No hematuria or dysuria urgency or frequency.  No penile discharge or pain.  Has not taken anything for pain.

## 2024-02-05 ENCOUNTER — NON-APPOINTMENT (OUTPATIENT)
Age: 44
End: 2024-02-05

## 2024-02-05 ENCOUNTER — TRANSCRIPTION ENCOUNTER (OUTPATIENT)
Age: 44
End: 2024-02-05

## 2024-02-05 ENCOUNTER — APPOINTMENT (OUTPATIENT)
Dept: UROLOGY | Facility: CLINIC | Age: 44
End: 2024-02-05
Payer: MEDICAID

## 2024-02-05 VITALS
WEIGHT: 185 LBS | BODY MASS INDEX: 28.04 KG/M2 | OXYGEN SATURATION: 99 % | HEIGHT: 68 IN | TEMPERATURE: 97.8 F | HEART RATE: 78 BPM | DIASTOLIC BLOOD PRESSURE: 78 MMHG | SYSTOLIC BLOOD PRESSURE: 120 MMHG

## 2024-02-05 DIAGNOSIS — N20.0 CALCULUS OF KIDNEY: ICD-10-CM

## 2024-02-05 LAB
CULTURE RESULTS: SIGNIFICANT CHANGE UP
SPECIMEN SOURCE: SIGNIFICANT CHANGE UP

## 2024-02-05 PROCEDURE — 99204 OFFICE O/P NEW MOD 45 MIN: CPT

## 2024-02-05 RX ORDER — OXYCODONE HYDROCHLORIDE AND ACETAMINOPHEN 325; 5 MG/5ML; MG/5ML
5-325 SOLUTION ORAL
Refills: 0 | Status: ACTIVE | COMMUNITY

## 2024-02-05 RX ORDER — TAMSULOSIN HYDROCHLORIDE 0.4 MG/1
0.4 CAPSULE ORAL
Qty: 30 | Refills: 1 | Status: ACTIVE | COMMUNITY
Start: 2024-02-05 | End: 1900-01-01

## 2024-02-05 RX ORDER — POTASSIUM CITRATE 15 MEQ/1
15 MEQ TABLET, EXTENDED RELEASE ORAL
Qty: 30 | Refills: 1 | Status: ACTIVE | COMMUNITY
Start: 2024-02-05 | End: 1900-01-01

## 2024-02-05 RX ORDER — TAMSULOSIN HYDROCHLORIDE 0.4 MG/1
0.4 CAPSULE ORAL
Refills: 0 | Status: ACTIVE | COMMUNITY

## 2024-02-05 RX ORDER — SULFAMETHOXAZOLE AND TRIMETHOPRIM 400; 80 MG/1; MG/1
TABLET ORAL
Refills: 0 | Status: ACTIVE | COMMUNITY

## 2024-02-05 NOTE — HISTORY OF PRESENT ILLNESS
[FreeTextEntry1] : Referring Provider: ED Chief Complaint: Nephrolithiasis  Date of first visit: 02/05/2024     ANISA SANTOS is a 43-year-old man with a PMHx of spine issues who presents for evaluation of renal colic.  [facility] with left-sided flank pain that radiated to the groin that did not subside.  Patient at first was attributing pain to chronic back problems but found no relief with laying supine and relaxing.  Patient denies fevers gross hematuria or any voiding problems.  Patient does have a strong family history of nephrolithiasis most notably in both his brother and sister and both are known to have uric acid stones. Abdominal ultrasound from March 2016 shows no evidence of intrarenal stones.  CT scan done in the ER shows bilateral non-obstructing stones and 2 UVJ stones on the left side.  Patient currently has pain controlled without any nausea.  Urine culture negative UA showed pH of 5.5, no nitrates and leuk esterase.  CBC and BMP were non-concerning with normal creatinine and potassium.    CT Hx - Stone samayoa (02/04/24): ADRENALS: Within normal limits; KIDNEYS/URETERS: Obstructing 2 mm calculus in the left UVJ with mild hydroureteronephrosis. Few additional non-obstructing calculi in the left kidney measuring up to 2 mm. Non-obstructing punctate calculus in the right lower pole; BLADDER: Within normal limits; REPRODUCTIVE ORGANS: Prostate within normal limits.   PSA Hx: n/a  Biopsy Hx: n/a MRI Hx: n/a   02/05/2024 IPSS 8 QOL 0 MADELYN 25    PMHx: SxHx: 3rd metatarsal (2022). left distal bicep tendon (2020), right distal bicep tendon (2019), right shoulder AC separation (2005), left shoulder clavicle resection (2017) FHx: no  related malignancies, (+) thyroid and colon cancer SocHx: Former smoker (approximately 1 pack/day for 15 years) Allergies:   The patient denies fevers, chills, nausea and or vomiting and no unexplained weight loss. All pertinent laboratory, films and physician notes were reviewed.  Questionnaire results were discussed with patient.

## 2024-02-05 NOTE — ASSESSMENT
[FreeTextEntry1] : 43M with bilateral intrarenal calculi as well as obstructing stones in the left distal ureter with mild hydro seen on CT scan from 02/04/2024.  - CT from ED reviewed as well as US from 2016 - Trial of medical expulsive therapy   -Flomax 0.4mg qhs (rx sent) - The patient understands that this medication may cause dizziness, retrograde ejaculation or nasal congestion among other complications. I recommended that the patient take this medication at night. Pt was instructed to not take this medication at the same time as ant-hypertensive medications or PDE5i. If the side effects become too bothersome, the medication can be discontinued.    -Pain control: NSAIDs if able to tolerate and normal renal function; augment with Tylenol   -Senna/Miralax   -Aggressive hydration   -Strain urine for calculi   -Instructed to go to the ER for any fever > 100.4, severe uncontrolled pain uncontrolled, or inability to tolerate PO - Will start patient on potassium citrate to alkalinize urine due to appearance of stone on CT kidney scan to be uric acid in nature - Will recheck UA and BMP at next visit - Patient has passed stone in a month we will order repeat CT to evaluate for stone burden    Viraj Brownlee MD Chief of Urology, [address], [address] [place] [address] Phone: [phone] Fax: [phone]

## 2024-02-05 NOTE — PHYSICAL EXAM
[General Appearance - Well Developed] : well developed [General Appearance - Well Nourished] : well nourished [Normal Appearance] : normal appearance [Well Groomed] : well groomed [General Appearance - In No Acute Distress] : no acute distress [Edema] : no peripheral edema [Respiration, Rhythm And Depth] : normal respiratory rhythm and effort [Exaggerated Use Of Accessory Muscles For Inspiration] : no accessory muscle use [Abdomen Soft] : soft [Abdomen Tenderness] : non-tender [Costovertebral Angle Tenderness] : no ~M costovertebral angle tenderness [Urinary Bladder Findings] : the bladder was normal on palpation [Normal Station and Gait] : the gait and station were normal for the patient's age [Skin Color & Pigmentation] : normal skin color and pigmentation [Skin Turgor] : supple [] : no rash [No Focal Deficits] : no focal deficits [Oriented To Time, Place, And Person] : oriented to person, place, and time [Affect] : the affect was normal [Mood] : the mood was normal [Not Anxious] : not anxious [No Palpable Adenopathy] : no palpable adenopathy [de-identified] :  exam deferred

## 2024-02-09 NOTE — POST DISCHARGE NOTE - DETAILS:
Called to follow up regarding recent ER visit, patient doing better, followed up with Urologist Dr. Murillo. Discussed incidental finding of Left inguinal hernia, patient aware he had it for years, wants to speak with his PMD about it first before speaking to a surgeon.

## 2024-04-25 NOTE — ASU PREOPERATIVE ASSESSMENT, ADULT ([facility]ARK ONLY) - AMOUNT
[Well Developed] : well developed [Well Nourished] : well nourished [No Acute Distress] : no acute distress [Normal Conjunctiva] : normal conjunctiva [Normal Venous Pressure] : normal venous pressure [No Carotid Bruit] : no carotid bruit [Normal S1, S2] : normal S1, S2 [No Rub] : no rub [No Gallop] : no gallop [Normal Rate] : normal [Rhythm Regular] : regular [I] : a grade 1 [Right Carotid Bruit] : no bruit heard over the right carotid [Left Carotid Bruit] : no bruit heard over the left carotid [Clear Lung Fields] : clear lung fields [Good Air Entry] : good air entry [No Respiratory Distress] : no respiratory distress  [Soft] : abdomen soft [Non Tender] : non-tender [No Masses/organomegaly] : no masses/organomegaly [Normal Bowel Sounds] : normal bowel sounds [Normal Gait] : normal gait 3/4 of cigarette pack [No Edema] : no edema [No Cyanosis] : no cyanosis [No Clubbing] : no clubbing [No Varicosities] : no varicosities [No Rash] : no rash [No Skin Lesions] : no skin lesions [Moves all extremities] : moves all extremities [No Focal Deficits] : no focal deficits [Normal Speech] : normal speech [Alert and Oriented] : alert and oriented [Normal memory] : normal memory [de-identified] : 1/6 systolic murmur

## 2024-10-31 NOTE — ASU DISCHARGE PLAN (ADULT/PEDIATRIC) - CARE PROVIDER_API CALL
Pt resting in bed. No distress notes. Catrachito at bedside to discuss finding with mother whom is guardian on staff members phone.    Madi Beatty)  Orthopaedic Sports Medicine; Orthopaedic Surgery  [address] [facility] [address]  [place] [address]  Phone: [phone]  Fax: [phone]  Follow Up Time:

## 2024-11-12 NOTE — H&P PST ADULT - ASSESSMENT
Goal Outcome Evaluation:           Progress: no change        Patient without any acute complications overnight. Urine output  mls/hr. Patient still reporting pain at times but adequately controlled with PRN Tramadol. Tube feeds placed on hold per hospitalist due to patient complaining of ABD discomfort and order for Tums placed. Patient min assist with turning in bed. Patient assisted with weight shifting/turning q2hrs. Chg wipes completed with [facility] care.       Problem: Adult Inpatient Plan of Care  Goal: Plan of Care Review  Outcome: Progressing  Flowsheets (Taken 11/12/2024 0540)  Progress: no change  Goal: Patient-Specific Goal (Individualized)  Outcome: Progressing  Goal: Absence of Hospital-Acquired Illness or Injury  Outcome: Progressing  Intervention: Identify and Manage Fall Risk  Recent Flowsheet Documentation  Taken 11/12/2024 0227 by Keon Duarte II RN  Safety Promotion/Fall Prevention:   activity supervised   safety round/check completed   assistive device/personal items within reach   clutter free environment maintained   fall prevention program maintained   muscle strengthening facilitated   nonskid shoes/slippers when out of bed   room organization consistent  Taken 11/12/2024 0003 by Keon Duarte II, RN  Safety Promotion/Fall Prevention:   activity supervised   safety round/check completed   assistive device/personal items within reach   clutter free environment maintained   fall prevention program maintained   muscle strengthening facilitated   nonskid shoes/slippers when out of bed   room organization consistent  Taken 11/11/2024 2220 by Keon Duarte II, RN  Safety Promotion/Fall Prevention:   activity supervised   safety round/check completed   assistive device/personal items within reach   clutter free environment maintained   fall prevention program maintained   muscle strengthening facilitated   nonskid shoes/slippers when out of bed   room organization consistent  Taken 11/11/2024  2000 by Keon Duarte II, RN  Safety Promotion/Fall Prevention:   activity supervised   safety round/check completed   assistive device/personal items within reach   clutter free environment maintained   fall prevention program maintained   muscle strengthening facilitated   nonskid shoes/slippers when out of bed   room organization consistent  Intervention: Prevent Skin Injury  Recent Flowsheet Documentation  Taken 11/12/2024 0227 by Keon Duarte II, RN  Body Position:   position changed independently   weight shifting   upper extremity elevated   left   turned  Taken 11/12/2024 0003 by Keon Duarte II, RN  Body Position:   position changed independently   left   turned   upper extremity elevated   heels elevated  Taken 11/11/2024 2220 by Keon Duarte II, RN  Body Position:   weight shifting   upper extremity elevated   turned   right  Taken 11/11/2024 2000 by Keon Duarte II, RN  Body Position:   weight shifting   upper extremity elevated   turned   left  Skin Protection: incontinence pads utilized  Intervention: Prevent and Manage VTE (Venous Thromboembolism) Risk  Recent Flowsheet Documentation  Taken 11/11/2024 2000 by Keon Duarte II, RN  VTE Prevention/Management:   SCDs (sequential compression devices) on   bilateral  Intervention: Prevent Infection  Recent Flowsheet Documentation  Taken 11/12/2024 0227 by Keon Duarte II, RN  Infection Prevention:   personal protective equipment utilized   hand hygiene promoted   rest/sleep promoted   single patient room provided  Taken 11/12/2024 0003 by Keon Duarte II, RN  Infection Prevention:   personal protective equipment utilized   hand hygiene promoted   rest/sleep promoted   single patient room provided  Taken 11/11/2024 2220 by Keon Duarte II, RN  Infection Prevention:   personal protective equipment utilized   hand hygiene promoted   rest/sleep promoted   single patient room provided  Taken 11/11/2024 2000 by Keon Duarte II, RN  Infection  Prevention:   personal protective equipment utilized   hand hygiene promoted   rest/sleep promoted   single patient room provided  Goal: Optimal Comfort and Wellbeing  Outcome: Progressing  Intervention: Provide Person-Centered Care  Recent Flowsheet Documentation  Taken 11/12/2024 0227 by Keon Duarte II, RN  Trust Relationship/Rapport:   care explained   choices provided   emotional support provided   empathic listening provided   questions answered   questions encouraged   reassurance provided   thoughts/feelings acknowledged  Goal: Readiness for Transition of Care  Outcome: Progressing     Problem: Fall Injury Risk  Goal: Absence of Fall and Fall-Related Injury  Outcome: Progressing  Intervention: Identify and Manage Contributors  Recent Flowsheet Documentation  Taken 11/11/2024 2000 by Keon Duarte II, RN  Medication Review/Management:   medications reviewed   high-risk medications identified  Intervention: Promote Injury-Free Environment  Recent Flowsheet Documentation  Taken 11/12/2024 0227 by Keon Duarte II, RN  Safety Promotion/Fall Prevention:   activity supervised   safety round/check completed   assistive device/personal items within reach   clutter free environment maintained   fall prevention program maintained   muscle strengthening facilitated   nonskid shoes/slippers when out of bed   room organization consistent  Taken 11/12/2024 0003 by Keon Duarte II, RN  Safety Promotion/Fall Prevention:   activity supervised   safety round/check completed   assistive device/personal items within reach   clutter free environment maintained   fall prevention program maintained   muscle strengthening facilitated   nonskid shoes/slippers when out of bed   room organization consistent  Taken 11/11/2024 2220 by Keon Duarte II, RN  Safety Promotion/Fall Prevention:   activity supervised   safety round/check completed   assistive device/personal items within reach   clutter free environment maintained   fall  prevention program maintained   muscle strengthening facilitated   nonskid shoes/slippers when out of bed   room organization consistent  Taken 11/11/2024 2000 by Keon Duarte II, RN  Safety Promotion/Fall Prevention:   activity supervised   safety round/check completed   assistive device/personal items within reach   clutter free environment maintained   fall prevention program maintained   muscle strengthening facilitated   nonskid shoes/slippers when out of bed   room organization consistent     Problem: Skin Injury Risk Increased  Goal: Skin Health and Integrity  Outcome: Progressing  Intervention: Optimize Skin Protection  Recent Flowsheet Documentation  Taken 11/12/2024 0227 by Keon Duarte II, RN  Head of Bed (HOB) Positioning: HOB at 45 degrees  Taken 11/12/2024 0003 by Keon Duarte II, RN  Head of Bed (HOB) Positioning: HOB at 45 degrees  Taken 11/11/2024 2220 by Keon Duarte II, RN  Head of Bed (HOB) Positioning: HOB at 30-45 degrees  Taken 11/11/2024 2000 by Keon Duarte II, RN  Activity Management: bedrest  Pressure Reduction Techniques:   frequent weight shift encouraged   heels elevated off bed   weight shift assistance provided  Head of Bed (HOB) Positioning: HOB at 30-45 degrees  Pressure Reduction Devices:   specialty bed utilized   pressure-redistributing mattress utilized  Skin Protection: incontinence pads utilized     Problem: Restraint, Nonviolent  Goal: Absence of Harm or Injury  Outcome: Progressing  Intervention: Implement Least Restrictive Safety Strategies  Recent Flowsheet Documentation  Taken 11/12/2024 0227 by Keon Duarte II, RN  Medical Device Protection:   IV pole/bag removed from visual field   torso covered   tubing secured  Taken 11/12/2024 0003 by Keon Duarte II, RN  Medical Device Protection:   IV pole/bag removed from visual field   tubing secured   torso covered  Taken 11/11/2024 2220 by Keon Duarte II, RN  Medical Device Protection:   IV pole/bag removed from visual  field   tubing secured   torso covered  Taken 11/11/2024 2000 by Keon Duarte II, RN  Medical Device Protection:   IV pole/bag removed from visual field   torso covered   tubing secured  Diversional Activities: television  Intervention: Protect Dignity, Rights and Personal Wellbeing  Recent Flowsheet Documentation  Taken 11/12/2024 0227 by Keon Duarte II, RN  Trust Relationship/Rapport:   care explained   choices provided   emotional support provided   empathic listening provided   questions answered   questions encouraged   reassurance provided   thoughts/feelings acknowledged  Intervention: Protect Skin and Joint Integrity  Recent Flowsheet Documentation  Taken 11/12/2024 0227 by Keon Duarte II, RN  Body Position:   position changed independently   weight shifting   upper extremity elevated   left   turned  Taken 11/12/2024 0003 by Keon Duarte II, RN  Body Position:   position changed independently   left   turned   upper extremity elevated   heels elevated  Taken 11/11/2024 2220 by Keon Duarte II, RN  Body Position:   weight shifting   upper extremity elevated   turned   right  Taken 11/11/2024 2000 by Keon Duarte II, RN  Body Position:   weight shifting   upper extremity elevated   turned   left  Skin Protection: incontinence pads utilized  Range of Motion:   active ROM (range of motion) encouraged   ROM (range of motion) performed     Problem: Mechanical Ventilation Invasive  Goal: Optimal Nutrition Delivery  Outcome: Progressing  Intervention: Optimize Nutrition Delivery  Recent Flowsheet Documentation  Taken 11/11/2024 2000 by Keon Duarte II, RN  Nutrition Support Management: tube feeding initiated  Goal: Absence of Device-Related Skin and Tissue Injury  Outcome: Progressing  Intervention: Maintain Skin and Tissue Health  Recent Flowsheet Documentation  Taken 11/11/2024 2000 by Keon Duarte II, RN  Device Skin Pressure Protection:   tubing/devices free from skin contact   positioning supports  utilized   pressure points protected   absorbent pad utilized/changed  Goal: Absence of Ventilator-Induced Lung Injury  Outcome: Progressing  Intervention: Prevent Ventilator-Associated Pneumonia  Recent Flowsheet Documentation  Taken 11/12/2024 0227 by Keon Duarte II, RN  Head of Bed (HOB) Positioning: HOB at 45 degrees  Taken 11/12/2024 0003 by Keon Duarte II, RN  Head of Bed (HOB) Positioning: HOB at 45 degrees  Taken 11/11/2024 2220 by Keon Duarte II, RN  Head of Bed (HOB) Positioning: HOB at 30-45 degrees  Taken 11/11/2024 2000 by Keon Duarte II, RN  Head of Bed (HOB) Positioning: HOB at 30-45 degrees     Problem: Sepsis/Septic Shock  Goal: Optimal Coping  Outcome: Progressing  Intervention: Support Patient and Family Response  Recent Flowsheet Documentation  Taken 11/11/2024 2000 by Keon Duarte II, RN  Family/Support System Care:   involvement promoted   presence promoted   support provided  Goal: Absence of Bleeding  Outcome: Progressing  Intervention: Monitor and Manage Bleeding  Recent Flowsheet Documentation  Taken 11/11/2024 2000 by Keon Duarte II, RN  Bleeding Precautions: blood pressure closely monitored  Goal: Blood Glucose Level Within Target Range  Outcome: Progressing  Intervention: Optimize Glycemic Control  Recent Flowsheet Documentation  Taken 11/11/2024 2000 by Keon Duarte II, RN  Hyperglycemia Management: blood glucose monitored  Hypoglycemia Management: blood glucose monitored  Goal: Absence of Infection Signs and Symptoms  Outcome: Progressing  Intervention: Initiate Sepsis Management  Recent Flowsheet Documentation  Taken 11/12/2024 0227 by Keon Duarte II, RN  Infection Prevention:   personal protective equipment utilized   hand hygiene promoted   rest/sleep promoted   single patient room provided  Taken 11/12/2024 0003 by Keon Duarte II, RN  Infection Prevention:   personal protective equipment utilized   hand hygiene promoted   rest/sleep promoted   single patient room  provided  Taken 11/11/2024 2220 by Keon Duarte II, RN  Infection Prevention:   personal protective equipment utilized   hand hygiene promoted   rest/sleep promoted   single patient room provided  Taken 11/11/2024 2000 by Keon Duarte II, RN  Infection Prevention:   personal protective equipment utilized   hand hygiene promoted   rest/sleep promoted   single patient room provided  Intervention: Promote Recovery  Recent Flowsheet Documentation  Taken 11/11/2024 2000 by Keon Duarte II, RN  Activity Management: bedrest  Sleep/Rest Enhancement:   awakenings minimized   consistent schedule promoted   noise level reduced   therapeutic touch utilized   relaxation techniques promoted   room darkened  Goal: Optimal Nutrition Delivery  Outcome: Progressing  Intervention: Optimize Nutrition Delivery  Recent Flowsheet Documentation  Taken 11/11/2024 2000 by Keon Duarte II, RN  Nutrition Support Management: tube feeding initiated     Problem: Enteral Nutrition  Goal: Absence of Aspiration Signs and Symptoms  Outcome: Progressing  Intervention: Minimize Aspiration Risk  Recent Flowsheet Documentation  Taken 11/12/2024 0227 by Keon Duarte II, RN  Head of Bed (HOB) Positioning: HOB at 45 degrees  Taken 11/12/2024 0003 by Keon Duarte II, RN  Head of Bed (HOB) Positioning: HOB at 45 degrees  Taken 11/11/2024 2220 by Keon Duarte II, RN  Head of Bed (HOB) Positioning: HOB at 30-45 degrees  Taken 11/11/2024 2000 by Keon Duarte II, RN  Head of Bed (HOB) Positioning: HOB at 30-45 degrees  Enteral Feeding Safety: tubing labeled as enteral feeding  Goal: Safe, Effective Therapy Delivery  Outcome: Progressing  Intervention: Prevent Feeding-Related Adverse Events  Recent Flowsheet Documentation  Taken 11/11/2024 2000 by Keon Duarte II, RN  Enteral Feeding Safety: tubing labeled as enteral feeding  Goal: Feeding Tolerance  Outcome: Progressing  Intervention: Prevent and Manage Feeding Intolerance  Recent Flowsheet  Documentation  Taken 11/11/2024 2000 by Keon Duarte II, RN  Nutrition Support Management: tube feeding initiated                            40 yo male with preop dx of strain of muscle, fascia and tendon of other parts of biceps, left arm, initial encounter

## 2025-06-13 NOTE — ASU PREOPERATIVE ASSESSMENT, ADULT ([facility] ONLY) - TEACHING/LEARNING OTHER LEARNERS
The labs needed are the same as what Dr Amado ordered. Patient is going to get it all done at the same time and use that lab slip.    significant other/mother

## 2025-08-26 ENCOUNTER — APPOINTMENT (OUTPATIENT)
Dept: RHEUMATOLOGY | Facility: CLINIC | Age: 45
End: 2025-08-26
Payer: MEDICAID

## 2025-08-26 ENCOUNTER — NON-APPOINTMENT (OUTPATIENT)
Age: 45
End: 2025-08-26

## 2025-08-26 VITALS
SYSTOLIC BLOOD PRESSURE: 112 MMHG | HEART RATE: 78 BPM | BODY MASS INDEX: 28.19 KG/M2 | OXYGEN SATURATION: 97 % | DIASTOLIC BLOOD PRESSURE: 62 MMHG | HEIGHT: 68 IN | WEIGHT: 186 LBS

## 2025-08-26 DIAGNOSIS — G89.29 LOW BACK PAIN, UNSPECIFIED: ICD-10-CM

## 2025-08-26 DIAGNOSIS — G89.29 PAIN IN RIGHT SHOULDER: ICD-10-CM

## 2025-08-26 DIAGNOSIS — M25.512 PAIN IN RIGHT SHOULDER: ICD-10-CM

## 2025-08-26 DIAGNOSIS — M54.50 LOW BACK PAIN, UNSPECIFIED: ICD-10-CM

## 2025-08-26 DIAGNOSIS — M79.671 PAIN IN RIGHT FOOT: ICD-10-CM

## 2025-08-26 DIAGNOSIS — G89.29 PAIN IN RIGHT HIP: ICD-10-CM

## 2025-08-26 DIAGNOSIS — M25.552 PAIN IN RIGHT HIP: ICD-10-CM

## 2025-08-26 DIAGNOSIS — M79.672 PAIN IN RIGHT FOOT: ICD-10-CM

## 2025-08-26 DIAGNOSIS — M25.571 PAIN IN RIGHT ANKLE AND JOINTS OF RIGHT FOOT: ICD-10-CM

## 2025-08-26 DIAGNOSIS — M25.551 PAIN IN RIGHT HIP: ICD-10-CM

## 2025-08-26 DIAGNOSIS — M79.641 PAIN IN RIGHT HAND: ICD-10-CM

## 2025-08-26 DIAGNOSIS — G89.29 PAIN IN RIGHT ANKLE AND JOINTS OF RIGHT FOOT: ICD-10-CM

## 2025-08-26 DIAGNOSIS — M25.572 PAIN IN RIGHT ANKLE AND JOINTS OF RIGHT FOOT: ICD-10-CM

## 2025-08-26 DIAGNOSIS — M79.642 PAIN IN RIGHT HAND: ICD-10-CM

## 2025-08-26 DIAGNOSIS — M25.532 PAIN IN RIGHT WRIST: ICD-10-CM

## 2025-08-26 DIAGNOSIS — M25.511 PAIN IN RIGHT SHOULDER: ICD-10-CM

## 2025-08-26 DIAGNOSIS — M25.531 PAIN IN RIGHT WRIST: ICD-10-CM

## 2025-08-26 PROCEDURE — 99204 OFFICE O/P NEW MOD 45 MIN: CPT

## 2025-08-26 PROCEDURE — G2211 COMPLEX E/M VISIT ADD ON: CPT | Mod: NC
